# Patient Record
Sex: FEMALE | Race: OTHER | URBAN - METROPOLITAN AREA
[De-identification: names, ages, dates, MRNs, and addresses within clinical notes are randomized per-mention and may not be internally consistent; named-entity substitution may affect disease eponyms.]

---

## 2020-12-09 ENCOUNTER — NURSE TRIAGE (OUTPATIENT)
Dept: FAMILY MEDICINE CLINIC | Facility: CLINIC | Age: 30
End: 2020-12-09

## 2020-12-10 NOTE — TELEPHONE ENCOUNTER
Onset about a week, almost daily, R side lower abdominal pain, fatigue. LMP is not exact approximately 2 weeks ago, 'it was finished by thanksgiving' the patient is sexually active and not using birth control. She is not an established patient.  She was adv

## 2020-12-11 NOTE — TELEPHONE ENCOUNTER
Patient has new patient appointment scheduled with Dr. Noemy Ferro. Was advised ER yesterday, please advise on upcoming appointment.     Future Appointments   Date Time Provider Tami Rubin   12/15/2020  3:00 PM Christina Day MD [address] [place] [address]

## 2020-12-22 ENCOUNTER — TELEPHONE (OUTPATIENT)
Dept: FAMILY MEDICINE CLINIC | Facility: CLINIC | Age: 30
End: 2020-12-22

## 2020-12-22 ENCOUNTER — OFFICE VISIT (OUTPATIENT)
Dept: FAMILY MEDICINE CLINIC | Facility: CLINIC | Age: 30
End: 2020-12-22
Payer: COMMERCIAL

## 2020-12-22 VITALS
HEIGHT: 67 IN | WEIGHT: 141.19 LBS | DIASTOLIC BLOOD PRESSURE: 79 MMHG | HEART RATE: 65 BPM | SYSTOLIC BLOOD PRESSURE: 129 MMHG | TEMPERATURE: 98 F | BODY MASS INDEX: 22.16 KG/M2

## 2020-12-22 DIAGNOSIS — R10.2 PELVIC PAIN: Primary | ICD-10-CM

## 2020-12-22 DIAGNOSIS — Z11.3 SCREEN FOR STD (SEXUALLY TRANSMITTED DISEASE): ICD-10-CM

## 2020-12-22 DIAGNOSIS — Z12.4 PAP SMEAR FOR CERVICAL CANCER SCREENING: ICD-10-CM

## 2020-12-22 DIAGNOSIS — B37.3 VAGINAL CANDIDIASIS: ICD-10-CM

## 2020-12-22 PROCEDURE — 3074F SYST BP LT 130 MM HG: CPT | Performed by: FAMILY MEDICINE

## 2020-12-22 PROCEDURE — 81002 URINALYSIS NONAUTO W/O SCOPE: CPT | Performed by: FAMILY MEDICINE

## 2020-12-22 PROCEDURE — 3078F DIAST BP <80 MM HG: CPT | Performed by: FAMILY MEDICINE

## 2020-12-22 PROCEDURE — 3008F BODY MASS INDEX DOCD: CPT | Performed by: FAMILY MEDICINE

## 2020-12-22 PROCEDURE — 99203 OFFICE O/P NEW LOW 30 MIN: CPT | Performed by: FAMILY MEDICINE

## 2020-12-22 RX ORDER — FLUCONAZOLE 150 MG/1
150 TABLET ORAL DAILY
Qty: 2 TABLET | Refills: 2 | Status: SHIPPED | OUTPATIENT
Start: 2020-12-22

## 2020-12-22 RX ORDER — CLOTRIMAZOLE 1 %
1 CREAM WITH APPLICATOR VAGINAL 2 TIMES DAILY
Qty: 1 TUBE | Refills: 0 | Status: SHIPPED | OUTPATIENT
Start: 2020-12-22 | End: 2020-12-23

## 2020-12-22 NOTE — TELEPHONE ENCOUNTER
Tried calling [employer] but just keep ringing and went to busy signal.    Dr Lori Pérez below, not sure if the SIG or the quantity needs clarification. pended new script, do necessary corrections if needed before signing it.

## 2020-12-22 NOTE — TELEPHONE ENCOUNTER
[id number] calling to get clarification on the directions for the medication below. •  clotrimazole (GYNE-LOTRIMIN) 1 % Vaginal Cream, Place 1 Application vaginally 2 (two) times daily. , Disp: 1 Tube, Rfl: 0

## 2020-12-23 PROBLEM — R10.2 PELVIC PAIN: Status: ACTIVE | Noted: 2020-12-23

## 2020-12-23 RX ORDER — CLOTRIMAZOLE 1 %
1 CREAM WITH APPLICATOR VAGINAL 2 TIMES DAILY
Qty: 1 TUBE | Refills: 0 | Status: SHIPPED | OUTPATIENT
Start: 2020-12-23

## 2020-12-23 NOTE — PROGRESS NOTES
Timbo Evans is a 35year old female.   Patient presents with:  Abdominal Pain: Started about 3 weeks ago, pain initially was not consistent but is now, patient has some irritation around vaginal area  Routine Physical: Patient would like to st /79   Pulse 65   Temp 98.3 °F (36.8 °C) (Temporal)   Ht 5' 7\" (1.702 m)   Wt 141 lb 3.2 oz (64 kg)   LMP 11/22/2020 (Approximate)   BMI 22.12 kg/m²   GENERAL: well developed, well nourished,in no apparent distress  SKIN: no rashes,no suspicious le

## 2021-01-11 ENCOUNTER — OFFICE VISIT (OUTPATIENT)
Dept: OBGYN CLINIC | Facility: CLINIC | Age: 31
End: 2021-01-11
Payer: COMMERCIAL

## 2021-01-11 VITALS
BODY MASS INDEX: 21.56 KG/M2 | HEIGHT: 67 IN | DIASTOLIC BLOOD PRESSURE: 72 MMHG | SYSTOLIC BLOOD PRESSURE: 112 MMHG | WEIGHT: 137.38 LBS

## 2021-01-11 DIAGNOSIS — R10.2 PELVIC PAIN: Primary | ICD-10-CM

## 2021-01-11 PROCEDURE — 3078F DIAST BP <80 MM HG: CPT | Performed by: OBSTETRICS & GYNECOLOGY

## 2021-01-11 PROCEDURE — 3008F BODY MASS INDEX DOCD: CPT | Performed by: OBSTETRICS & GYNECOLOGY

## 2021-01-11 PROCEDURE — 99203 OFFICE O/P NEW LOW 30 MIN: CPT | Performed by: OBSTETRICS & GYNECOLOGY

## 2021-01-11 PROCEDURE — 3074F SYST BP LT 130 MM HG: CPT | Performed by: OBSTETRICS & GYNECOLOGY

## 2021-01-11 NOTE — PROGRESS NOTES
HPI:    Patient ID: Gabriela Lugo is a 35year old female. Patient reports pelvic pain since Thanksgiving that was constant but it is gone now. Patient is not on any contraception and would like to conceive in the near future.   Pain was loc Prescriptions      No prescriptions requested or ordered in this encounter       Imaging & Referrals:  US PELVIS (TRANSVAGINAL PELVIS) (CPT=76830)       #[phone]

## 2023-07-31 ENCOUNTER — OFFICE VISIT (OUTPATIENT)
Dept: FAMILY MEDICINE CLINIC | Facility: CLINIC | Age: 33
End: 2023-07-31
Payer: COMMERCIAL

## 2023-07-31 ENCOUNTER — TELEPHONE (OUTPATIENT)
Dept: OBGYN CLINIC | Facility: CLINIC | Age: 33
End: 2023-07-31

## 2023-07-31 VITALS
SYSTOLIC BLOOD PRESSURE: 114 MMHG | OXYGEN SATURATION: 98 % | HEIGHT: 67 IN | WEIGHT: 140 LBS | BODY MASS INDEX: 21.97 KG/M2 | DIASTOLIC BLOOD PRESSURE: 65 MMHG | RESPIRATION RATE: 18 BRPM | HEART RATE: 66 BPM | TEMPERATURE: 98 F

## 2023-07-31 DIAGNOSIS — R11.0 NAUSEA: ICD-10-CM

## 2023-07-31 DIAGNOSIS — Z32.01 ENCOUNTER FOR PREGNANCY TEST, RESULT POSITIVE: Primary | ICD-10-CM

## 2023-07-31 LAB
APPEARANCE: CLEAR
BILIRUBIN: NEGATIVE
CONTROL LINE PRESENT WITH A CLEAR BACKGROUND (YES/NO): YES YES/NO
GLUCOSE (URINE DIPSTICK): NEGATIVE MG/DL
KETONES (URINE DIPSTICK): NEGATIVE MG/DL
KIT LOT #: ABNORMAL NUMERIC
LEUKOCYTES: NEGATIVE
MULTISTIX LOT#: ABNORMAL NUMERIC
NITRITE, URINE: NEGATIVE
PH, URINE: 7 (ref 4.5–8)
PREGNANCY TEST, URINE: POSITIVE
PROTEIN (URINE DIPSTICK): NEGATIVE MG/DL
SPECIFIC GRAVITY: 1.02 (ref 1–1.03)
URINE-COLOR: YELLOW
UROBILINOGEN,SEMI-QN: 0.2 MG/DL (ref 0–1.9)

## 2023-07-31 PROCEDURE — 3078F DIAST BP <80 MM HG: CPT | Performed by: NURSE PRACTITIONER

## 2023-07-31 PROCEDURE — 81025 URINE PREGNANCY TEST: CPT | Performed by: NURSE PRACTITIONER

## 2023-07-31 PROCEDURE — 99203 OFFICE O/P NEW LOW 30 MIN: CPT | Performed by: NURSE PRACTITIONER

## 2023-07-31 PROCEDURE — 81003 URINALYSIS AUTO W/O SCOPE: CPT | Performed by: NURSE PRACTITIONER

## 2023-07-31 PROCEDURE — 3008F BODY MASS INDEX DOCD: CPT | Performed by: NURSE PRACTITIONER

## 2023-07-31 PROCEDURE — 3074F SYST BP LT 130 MM HG: CPT | Performed by: NURSE PRACTITIONER

## 2023-07-31 NOTE — TELEPHONE ENCOUNTER
Pt made appt on [employer], but asking for sooner availability. Last seen by Dr. Edwin Collins in 2021. Pls advise    8/23/2023    1:20 PM  20 mins. Jennifer Milligan MD   [facility]      Patient Comments:   A physical and pregnancy test. Have not been feeling well the past two weeks.

## 2023-08-08 ENCOUNTER — OFFICE VISIT (OUTPATIENT)
Dept: OBGYN CLINIC | Facility: CLINIC | Age: 33
End: 2023-08-08

## 2023-08-08 VITALS
HEIGHT: 67 IN | BODY MASS INDEX: 21.69 KG/M2 | SYSTOLIC BLOOD PRESSURE: 110 MMHG | DIASTOLIC BLOOD PRESSURE: 60 MMHG | WEIGHT: 138.19 LBS

## 2023-08-08 DIAGNOSIS — O21.9 VOMITING OR NAUSEA OF PREGNANCY: ICD-10-CM

## 2023-08-08 DIAGNOSIS — N91.2 AMENORRHEA: Primary | ICD-10-CM

## 2023-08-08 DIAGNOSIS — N92.6 MISSED MENSES: ICD-10-CM

## 2023-08-08 PROCEDURE — 3074F SYST BP LT 130 MM HG: CPT | Performed by: OBSTETRICS & GYNECOLOGY

## 2023-08-08 PROCEDURE — 3078F DIAST BP <80 MM HG: CPT | Performed by: OBSTETRICS & GYNECOLOGY

## 2023-08-08 PROCEDURE — 3008F BODY MASS INDEX DOCD: CPT | Performed by: OBSTETRICS & GYNECOLOGY

## 2023-08-08 RX ORDER — DOXYLAMINE SUCCINATE AND PYRIDOXINE HYDROCHLORIDE, DELAYED RELEASE TABLETS 10 MG/10 MG 10; 10 MG/1; MG/1
2 TABLET, DELAYED RELEASE ORAL NIGHTLY
Qty: 120 TABLET | Refills: 0 | Status: SHIPPED | OUTPATIENT
Start: 2023-08-08

## 2023-08-28 ENCOUNTER — ROUTINE PRENATAL (OUTPATIENT)
Dept: OBGYN CLINIC | Facility: CLINIC | Age: 33
End: 2023-08-28

## 2023-08-28 VITALS — DIASTOLIC BLOOD PRESSURE: 72 MMHG | WEIGHT: 140 LBS | SYSTOLIC BLOOD PRESSURE: 120 MMHG | BODY MASS INDEX: 22 KG/M2

## 2023-08-28 DIAGNOSIS — Z34.81 ENCOUNTER FOR SUPERVISION OF OTHER NORMAL PREGNANCY IN FIRST TRIMESTER: Primary | ICD-10-CM

## 2023-08-28 PROCEDURE — 3078F DIAST BP <80 MM HG: CPT | Performed by: OBSTETRICS & GYNECOLOGY

## 2023-08-28 PROCEDURE — 3074F SYST BP LT 130 MM HG: CPT | Performed by: OBSTETRICS & GYNECOLOGY

## 2023-08-28 PROCEDURE — 76817 TRANSVAGINAL US OBSTETRIC: CPT | Performed by: OBSTETRICS & GYNECOLOGY

## 2023-08-28 NOTE — PROGRESS NOTES
Tv utlrasound:   siup 13 2/7 weeks and edc 3/2/24 not c/w lmp  Fhts 162. Good fm. Noted on utlrasound. Normal uterus/cx/adnexae    Pt to schedule pap smear with next visit.

## 2023-09-15 ENCOUNTER — INITIAL PRENATAL (OUTPATIENT)
Dept: OBGYN CLINIC | Facility: CLINIC | Age: 33
End: 2023-09-15

## 2023-09-15 VITALS — SYSTOLIC BLOOD PRESSURE: 108 MMHG | WEIGHT: 142 LBS | BODY MASS INDEX: 22 KG/M2 | DIASTOLIC BLOOD PRESSURE: 70 MMHG

## 2023-09-15 DIAGNOSIS — Z11.3 SCREEN FOR STD (SEXUALLY TRANSMITTED DISEASE): ICD-10-CM

## 2023-09-15 DIAGNOSIS — Z34.82 ENCOUNTER FOR SUPERVISION OF OTHER NORMAL PREGNANCY IN SECOND TRIMESTER: Primary | ICD-10-CM

## 2023-09-15 PROCEDURE — 3074F SYST BP LT 130 MM HG: CPT | Performed by: OBSTETRICS & GYNECOLOGY

## 2023-09-15 PROCEDURE — 3078F DIAST BP <80 MM HG: CPT | Performed by: OBSTETRICS & GYNECOLOGY

## 2023-09-18 LAB
C TRACH DNA SPEC QL NAA+PROBE: NEGATIVE
HPV I/H RISK 1 DNA SPEC QL NAA+PROBE: NEGATIVE
N GONORRHOEA DNA SPEC QL NAA+PROBE: NEGATIVE

## 2023-09-22 ENCOUNTER — NURSE ONLY (OUTPATIENT)
Dept: OBGYN CLINIC | Facility: CLINIC | Age: 33
End: 2023-09-22

## 2023-09-22 DIAGNOSIS — Z34.02 ENCOUNTER FOR SUPERVISION OF NORMAL FIRST PREGNANCY IN SECOND TRIMESTER: Primary | ICD-10-CM

## 2023-09-22 RX ORDER — CHOLECALCIFEROL (VITAMIN D3) 25 MCG
1 TABLET,CHEWABLE ORAL DAILY
COMMUNITY

## 2023-09-22 NOTE — PROGRESS NOTES
Pt called today for RN [facility] Education. Missed menses apt with: VERONICA  LMP: 23    Pre  BMI: 21.92  EPDS score:   +UPT at home: 23  +UPT in office: 23    US:   Working S[place]NCHE[place]: 3/5/24  Hx of genetic abnormality in family: denies  Hx of varicella: had disease  Flu Vaccine: unsure  Covid Vaccine: no     Consent (if needed): n/a      Sterilization/Contraception: undecided    OUD Screening: Pt. Has answered NO 5P questions and has NO  risk factors. Pt. Given What pregnant women need to know handout. Educational material reviewed with patient: Prenatal care, nutrition, weight gain recommendations, travel, exercise, intercourse, pregnancy changes, safe medications, pregnancy and work, fetal movement, labor and  labor, warning signs, food safety, tdap, cord blood, breastfeeding, circumcision, and Group B strep. Plans to breastfeed. Undecided about circ if male. Blood transfusion if needed: consents  PN labs:     Optional genetic screening labs were reviewed: Fernando Wisdom, FTS with US, Quad screen MSAFP and CF screening. Pt opts for Perez Vann. Kit provided.     [facility] Media Policy: informed    NOB apt: had NOB 9/15 with VERONICA Scheduled 10/13 return OB with MISTI

## 2023-10-05 ENCOUNTER — TELEPHONE (OUTPATIENT)
Dept: OBGYN CLINIC | Facility: CLINIC | Age: 33
End: 2023-10-05

## 2023-10-05 NOTE — TELEPHONE ENCOUNTER
Incoming Fax received from [place] with patient's Panorama test results.     Sample collection date: 09/28/2023  Report date: 10/04/2023    Results: Low Risk  Low Risk for Aneuploidies and Microdeletions  Fetal Sex: Female  Fetal Fraction:  10.6

## 2023-10-13 ENCOUNTER — ROUTINE PRENATAL (OUTPATIENT)
Dept: OBGYN CLINIC | Facility: CLINIC | Age: 33
End: 2023-10-13
Payer: COMMERCIAL

## 2023-10-13 ENCOUNTER — HOSPITAL ENCOUNTER (OUTPATIENT)
Dept: ULTRASOUND IMAGING | Facility: HOSPITAL | Age: 33
Discharge: HOME | End: 2023-10-13
Attending: OBSTETRICS & GYNECOLOGY
Payer: COMMERCIAL

## 2023-10-13 VITALS — SYSTOLIC BLOOD PRESSURE: 114 MMHG | BODY MASS INDEX: 23 KG/M2 | WEIGHT: 147.81 LBS | DIASTOLIC BLOOD PRESSURE: 62 MMHG

## 2023-10-13 DIAGNOSIS — Z34.82 ENCOUNTER FOR SUPERVISION OF OTHER NORMAL PREGNANCY IN SECOND TRIMESTER: ICD-10-CM

## 2023-10-13 DIAGNOSIS — Z34.92 NORMAL PREGNANCY IN SECOND TRIMESTER: Primary | ICD-10-CM

## 2023-10-13 PROBLEM — R10.2 PELVIC PAIN: Status: RESOLVED | Noted: 2020-12-23 | Resolved: 2023-10-13

## 2023-10-13 PROCEDURE — 3074F SYST BP LT 130 MM HG: CPT | Performed by: OBSTETRICS & GYNECOLOGY

## 2023-10-13 PROCEDURE — 76805 OB US >/= 14 WKS SNGL FETUS: CPT | Performed by: OBSTETRICS & GYNECOLOGY

## 2023-10-13 PROCEDURE — 3078F DIAST BP <80 MM HG: CPT | Performed by: OBSTETRICS & GYNECOLOGY

## 2023-10-13 PROCEDURE — 90471 IMMUNIZATION ADMIN: CPT | Performed by: OBSTETRICS & GYNECOLOGY

## 2023-10-13 PROCEDURE — 76817 TRANSVAGINAL US OBSTETRIC: CPT | Performed by: OBSTETRICS & GYNECOLOGY

## 2023-10-13 PROCEDURE — 90686 IIV4 VACC NO PRSV 0.5 ML IM: CPT | Performed by: OBSTETRICS & GYNECOLOGY

## 2023-11-14 ENCOUNTER — ROUTINE PRENATAL (OUTPATIENT)
Dept: OBGYN CLINIC | Facility: CLINIC | Age: 33
End: 2023-11-14
Payer: COMMERCIAL

## 2023-11-14 VITALS
WEIGHT: 151.38 LBS | SYSTOLIC BLOOD PRESSURE: 118 MMHG | BODY MASS INDEX: 24 KG/M2 | DIASTOLIC BLOOD PRESSURE: 73 MMHG | HEART RATE: 70 BPM

## 2023-11-14 DIAGNOSIS — Z34.00 SUPERVISION OF NORMAL FIRST PREGNANCY, ANTEPARTUM: Primary | ICD-10-CM

## 2023-11-14 PROCEDURE — 3078F DIAST BP <80 MM HG: CPT | Performed by: OBSTETRICS & GYNECOLOGY

## 2023-11-14 PROCEDURE — 3074F SYST BP LT 130 MM HG: CPT | Performed by: OBSTETRICS & GYNECOLOGY

## 2023-11-15 ENCOUNTER — LAB ENCOUNTER (OUTPATIENT)
Dept: LAB | Age: 33
End: 2023-11-15
Attending: OBSTETRICS & GYNECOLOGY
Payer: COMMERCIAL

## 2023-11-15 DIAGNOSIS — Z34.92 NORMAL PREGNANCY IN SECOND TRIMESTER: ICD-10-CM

## 2023-11-15 DIAGNOSIS — Z34.02 ENCOUNTER FOR SUPERVISION OF NORMAL FIRST PREGNANCY IN SECOND TRIMESTER: ICD-10-CM

## 2023-11-15 LAB
ANTIBODY SCREEN: NEGATIVE
BASOPHILS # BLD AUTO: 0.05 X10(3) UL (ref 0–0.2)
BASOPHILS NFR BLD AUTO: 0.6 %
BILIRUB UR QL: NEGATIVE
CLARITY UR: CLEAR
DEPRECATED HBV CORE AB SER IA-ACNC: 16 NG/ML
DEPRECATED RDW RBC AUTO: 45.2 FL (ref 35.1–46.3)
EOSINOPHIL # BLD AUTO: 0.2 X10(3) UL (ref 0–0.7)
EOSINOPHIL NFR BLD AUTO: 2.6 %
ERYTHROCYTE [DISTWIDTH] IN BLOOD BY AUTOMATED COUNT: 14.5 % (ref 11–15)
GLUCOSE UR-MCNC: NORMAL MG/DL
HBV SURFACE AG SER-ACNC: <0.1 [IU]/L
HBV SURFACE AG SERPL QL IA: NONREACTIVE
HCT VFR BLD AUTO: 39.9 %
HCV AB SERPL QL IA: NONREACTIVE
HGB BLD-MCNC: 13.1 G/DL
HGB UR QL STRIP.AUTO: NEGATIVE
IMM GRANULOCYTES # BLD AUTO: 0.03 X10(3) UL (ref 0–1)
IMM GRANULOCYTES NFR BLD: 0.4 %
KETONES UR-MCNC: NEGATIVE MG/DL
LEUKOCYTE ESTERASE UR QL STRIP.AUTO: NEGATIVE
LYMPHOCYTES # BLD AUTO: 2.12 X10(3) UL (ref 1–4)
LYMPHOCYTES NFR BLD AUTO: 27.3 %
MCH RBC QN AUTO: 28.5 PG (ref 26–34)
MCHC RBC AUTO-ENTMCNC: 32.8 G/DL (ref 31–37)
MCV RBC AUTO: 86.7 FL
MONOCYTES # BLD AUTO: 0.58 X10(3) UL (ref 0.1–1)
MONOCYTES NFR BLD AUTO: 7.5 %
NEUTROPHILS # BLD AUTO: 4.78 X10 (3) UL (ref 1.5–7.7)
NEUTROPHILS # BLD AUTO: 4.78 X10(3) UL (ref 1.5–7.7)
NEUTROPHILS NFR BLD AUTO: 61.6 %
NITRITE UR QL STRIP.AUTO: NEGATIVE
PH UR: 6.5 [PH] (ref 5–8)
PLATELET # BLD AUTO: 187 10(3)UL (ref 150–450)
PROT UR-MCNC: NEGATIVE MG/DL
RBC # BLD AUTO: 4.6 X10(6)UL
RH BLOOD TYPE: NEGATIVE
RUBV IGG SER QL: POSITIVE
RUBV IGG SER-ACNC: 26.8 IU/ML (ref 10–?)
SP GR UR STRIP: 1.02 (ref 1–1.03)
UROBILINOGEN UR STRIP-ACNC: NORMAL
WBC # BLD AUTO: 7.8 X10(3) UL (ref 4–11)

## 2023-11-15 PROCEDURE — 87340 HEPATITIS B SURFACE AG IA: CPT

## 2023-11-15 PROCEDURE — 81003 URINALYSIS AUTO W/O SCOPE: CPT

## 2023-11-15 PROCEDURE — 36415 COLL VENOUS BLD VENIPUNCTURE: CPT

## 2023-11-15 PROCEDURE — 87389 HIV-1 AG W/HIV-1&-2 AB AG IA: CPT

## 2023-11-15 PROCEDURE — 86762 RUBELLA ANTIBODY: CPT

## 2023-11-15 PROCEDURE — 86850 RBC ANTIBODY SCREEN: CPT

## 2023-11-15 PROCEDURE — 82105 ALPHA-FETOPROTEIN SERUM: CPT

## 2023-11-15 PROCEDURE — 86803 HEPATITIS C AB TEST: CPT

## 2023-11-15 PROCEDURE — 86780 TREPONEMA PALLIDUM: CPT

## 2023-11-15 PROCEDURE — 82728 ASSAY OF FERRITIN: CPT

## 2023-11-15 PROCEDURE — 86901 BLOOD TYPING SEROLOGIC RH(D): CPT

## 2023-11-15 PROCEDURE — 87086 URINE CULTURE/COLONY COUNT: CPT

## 2023-11-15 PROCEDURE — 85025 COMPLETE CBC W/AUTO DIFF WBC: CPT

## 2023-11-15 PROCEDURE — 86900 BLOOD TYPING SEROLOGIC ABO: CPT

## 2023-11-16 LAB
AFP MOM: 5.97
AFP VALUE: 303.8 NG/ML
GA ON COLL DATE: 19 WEEKS
INSULIN DEP AFP: NO
MAT AGE AT EDD: 32.5 YR
MULTIPLE GEST AFP: NO
OSBR RISK 1 IN AFP: 10
WEIGHT AFP: 147 LBS

## 2023-11-17 ENCOUNTER — TELEPHONE (OUTPATIENT)
Dept: OBGYN CLINIC | Facility: CLINIC | Age: 33
End: 2023-11-17

## 2023-11-17 LAB — T PALLIDUM AB SER QL: NEGATIVE

## 2023-11-17 NOTE — TELEPHONE ENCOUNTER
Incoming call from lab in regards to AFP. Results came back positive. Patient completed on 11/15 at [facility] of 24w1d. Outside of time frame.  Sending to MISTI as [id number] Marlen Alvarez

## 2023-11-30 ENCOUNTER — LAB ENCOUNTER (OUTPATIENT)
Dept: LAB | Age: 33
End: 2023-11-30
Attending: OBSTETRICS & GYNECOLOGY
Payer: COMMERCIAL

## 2023-11-30 DIAGNOSIS — Z34.00 SUPERVISION OF NORMAL FIRST PREGNANCY, ANTEPARTUM: ICD-10-CM

## 2023-11-30 LAB — GLUCOSE 1H P GLC SERPL-MCNC: 174 MG/DL

## 2023-11-30 PROCEDURE — 82950 GLUCOSE TEST: CPT

## 2023-11-30 PROCEDURE — 36415 COLL VENOUS BLD VENIPUNCTURE: CPT

## 2023-12-01 DIAGNOSIS — R73.09 ELEVATED GLUCOSE TOLERANCE TEST: Primary | ICD-10-CM

## 2023-12-12 ENCOUNTER — ROUTINE PRENATAL (OUTPATIENT)
Dept: OBGYN CLINIC | Facility: CLINIC | Age: 33
End: 2023-12-12
Payer: COMMERCIAL

## 2023-12-12 DIAGNOSIS — O36.5931 SMALL FOR DATES AFFECTING MANAGEMENT OF MOTHER, THIRD TRIMESTER, FETUS 1: ICD-10-CM

## 2023-12-12 DIAGNOSIS — O26.899 RH NEGATIVE STATE IN ANTEPARTUM PERIOD: ICD-10-CM

## 2023-12-12 DIAGNOSIS — Z34.83 ENCOUNTER FOR SUPERVISION OF OTHER NORMAL PREGNANCY IN THIRD TRIMESTER: Primary | ICD-10-CM

## 2023-12-12 DIAGNOSIS — Z67.91 RH NEGATIVE STATE IN ANTEPARTUM PERIOD: ICD-10-CM

## 2023-12-12 LAB
APPEARANCE: CLEAR
BILIRUBIN: NEGATIVE
GLUCOSE (URINE DIPSTICK): NEGATIVE MG/DL
KETONES (URINE DIPSTICK): NEGATIVE MG/DL
LEUKOCYTES: NEGATIVE
MULTISTIX LOT#: NORMAL NUMERIC
NITRITE, URINE: NEGATIVE
OCCULT BLOOD: NEGATIVE
PH, URINE: 6 (ref 4.5–8)
SPECIFIC GRAVITY: 1.01 (ref 1–1.03)
URINE-COLOR: YELLOW
UROBILINOGEN,SEMI-QN: 0.2 MG/DL (ref 0–1.9)

## 2023-12-12 PROCEDURE — 90715 TDAP VACCINE 7 YRS/> IM: CPT | Performed by: OBSTETRICS & GYNECOLOGY

## 2023-12-12 PROCEDURE — 90471 IMMUNIZATION ADMIN: CPT | Performed by: OBSTETRICS & GYNECOLOGY

## 2023-12-12 PROCEDURE — 81002 URINALYSIS NONAUTO W/O SCOPE: CPT | Performed by: OBSTETRICS & GYNECOLOGY

## 2023-12-12 RX ORDER — FERROUS SULFATE 325(65) MG
325 TABLET ORAL EVERY OTHER DAY
Qty: 45 TABLET | Refills: 1 | Status: ON HOLD | OUTPATIENT
Start: 2023-12-12

## 2023-12-12 NOTE — PROGRESS NOTES
[facility]  Obstetrics and Gynecology  Prenatal Visit  Denny Preston MD    GREGORIA Hamlin is a 29year old. o. Edis Wolffn 28w0d weeks. Here for routine prenatal visit and is without complaints. Patient denies any regular uterine contractions, spontaneous rupture membranes or vaginal bleeding. Patient feeling normal fetal movement. OB History     OB History    Para Term  AB Living   1 0 0 0 0 0   SAB IAB Ectopic Multiple Live Births   0 0 0 0 0      # Outcome Date GA Lbr Robson/2nd Weight Sex Delivery Anes PTL Lv   1 Current              Medications     Current Outpatient Medications   Medication Sig Dispense Refill    Ferrous Sulfate 325 (65 Fe) MG Oral Tab Take 1 tablet (325 mg total) by mouth every other day. 45 tablet 1    prenatal vitamin with DHA 27-0.8-228 MG Oral Cap Take 1 capsule by mouth daily. Exam   LMP 2023 (Exact Date)   FH=24 cm  Assessment   Darío Carroll is a 29year old female  with viable IUP at 28w0d weeks. Primigravid pregnancy with size<dates  Rh negative status    ICD-10-CM    1. Encounter for supervision of other normal pregnancy in third trimester  Z34.83 POC Urinalysis, Manual Dip without microscopy [[id number]]     Antibody Screen     Patient Blood Type (ABORh)      2. Small for dates affecting management of mother, third trimester, fetus 1  [facility] - INTERNAL      3. Rh negative state in antepartum period  O26.899     Z67.91         Plan   Pt to go for Rhogam/Type and screen prior ASAP. Pt to have MAXIMILIAN/ Yakov Duran  for growth. Pt counseled on recommendations of tdap in pregnancy. Discussed risks of pertussis/\"whooping cough\", in newborns. Discussed benefits of preventing pertussis in those around the  such as parents, grandparents, caregivers, household contacts, other children etc.  Discussed side effects and rare risks of tdap vaccine including redness, pain, fatigue, body aches and fever.   Pt understands and tdap was offered/given.     Iris Browne MD, MD  4:31 PM  12/12/2023

## 2023-12-18 ENCOUNTER — ULTRASOUND ENCOUNTER (OUTPATIENT)
Dept: PERINATAL CARE | Facility: HOSPITAL | Age: 33
End: 2023-12-18
Attending: OBSTETRICS & GYNECOLOGY
Payer: COMMERCIAL

## 2023-12-18 ENCOUNTER — HOSPITAL ENCOUNTER (INPATIENT)
Facility: HOSPITAL | Age: 33
LOS: 6 days | Discharge: HOME | End: 2023-12-24
Attending: OBSTETRICS & GYNECOLOGY | Admitting: OBSTETRICS & GYNECOLOGY
Payer: COMMERCIAL

## 2023-12-18 ENCOUNTER — LAB ENCOUNTER (OUTPATIENT)
Dept: LAB | Facility: REFERENCE LAB | Age: 33
End: 2023-12-18
Attending: OBSTETRICS & GYNECOLOGY
Payer: COMMERCIAL

## 2023-12-18 ENCOUNTER — TELEPHONE (OUTPATIENT)
Dept: OBGYN CLINIC | Facility: CLINIC | Age: 33
End: 2023-12-18

## 2023-12-18 VITALS
DIASTOLIC BLOOD PRESSURE: 87 MMHG | BODY MASS INDEX: 24.64 KG/M2 | HEART RATE: 64 BPM | WEIGHT: 157 LBS | HEIGHT: 67 IN | SYSTOLIC BLOOD PRESSURE: 135 MMHG

## 2023-12-18 DIAGNOSIS — Z67.91 RH NEGATIVE STATE IN ANTEPARTUM PERIOD: ICD-10-CM

## 2023-12-18 DIAGNOSIS — O36.5990 POOR FETAL GROWTH AFFECTING MANAGEMENT OF MOTHER: ICD-10-CM

## 2023-12-18 DIAGNOSIS — O13.3 GESTATIONAL HYPERTENSION, THIRD TRIMESTER: ICD-10-CM

## 2023-12-18 DIAGNOSIS — O36.5930 POOR FETAL GROWTH AFFECTING MANAGEMENT OF MOTHER IN THIRD TRIMESTER, SINGLE OR UNSPECIFIED FETUS: ICD-10-CM

## 2023-12-18 DIAGNOSIS — O36.5931 IUGR (INTRAUTERINE GROWTH RESTRICTION) AFFECTING CARE OF MOTHER, THIRD TRIMESTER, FETUS 1: ICD-10-CM

## 2023-12-18 DIAGNOSIS — O36.5990 POOR FETAL GROWTH AFFECTING MANAGEMENT OF MOTHER: Primary | ICD-10-CM

## 2023-12-18 DIAGNOSIS — Z34.83 ENCOUNTER FOR SUPERVISION OF OTHER NORMAL PREGNANCY IN THIRD TRIMESTER: ICD-10-CM

## 2023-12-18 DIAGNOSIS — R73.09 ELEVATED GLUCOSE TOLERANCE TEST: ICD-10-CM

## 2023-12-18 DIAGNOSIS — O26.899 RH NEGATIVE STATE IN ANTEPARTUM PERIOD: ICD-10-CM

## 2023-12-18 LAB
ALBUMIN SERPL-MCNC: 2.8 G/DL (ref 3.4–5)
ALBUMIN/GLOB SERPL: 0.7 {RATIO} (ref 1–2)
ALP LIVER SERPL-CCNC: 118 U/L
ALT SERPL-CCNC: 17 U/L
ANION GAP SERPL CALC-SCNC: 6 MMOL/L (ref 0–18)
ANTIBODY SCREEN: POSITIVE
ANTIBODY SCREEN: POSITIVE
AST SERPL-CCNC: 16 U/L (ref 15–37)
BASOPHILS # BLD AUTO: 0.04 X10(3) UL (ref 0–0.2)
BASOPHILS NFR BLD AUTO: 0.5 %
BILIRUB SERPL-MCNC: 0.4 MG/DL (ref 0.1–2)
BILIRUB UR QL STRIP.AUTO: NEGATIVE
BUN BLD-MCNC: 12 MG/DL (ref 9–23)
CALCIUM BLD-MCNC: 8.7 MG/DL (ref 8.5–10.1)
CHLORIDE SERPL-SCNC: 107 MMOL/L (ref 98–112)
CLARITY UR REFRACT.AUTO: CLEAR
CO2 SERPL-SCNC: 24 MMOL/L (ref 21–32)
COLOR UR AUTO: COLORLESS
CREAT BLD-MCNC: 0.75 MG/DL
DIRECT COOMBS POLY: NEGATIVE
EGFRCR SERPLBLD CKD-EPI 2021: 108 ML/MIN/1.73M2 (ref 60–?)
EOSINOPHIL # BLD AUTO: 0.09 X10(3) UL (ref 0–0.7)
EOSINOPHIL NFR BLD AUTO: 1.1 %
ERYTHROCYTE [DISTWIDTH] IN BLOOD BY AUTOMATED COUNT: 14.4 %
GLOBULIN PLAS-MCNC: 3.8 G/DL (ref 2.8–4.4)
GLUCOSE 1H P GLC SERPL-MCNC: 173 MG/DL
GLUCOSE 2H P GLC SERPL-MCNC: 154 MG/DL
GLUCOSE 3H P GLC SERPL-MCNC: 86 MG/DL (ref 70–140)
GLUCOSE BLD-MCNC: 105 MG/DL (ref 70–99)
GLUCOSE P FAST SERPL-MCNC: 79 MG/DL
GLUCOSE UR STRIP.AUTO-MCNC: NORMAL MG/DL
HCT VFR BLD AUTO: 39.5 %
HGB BLD-MCNC: 13.5 G/DL
HIV 1+2 AB+HIV1 P24 AG SERPL QL IA: NONREACTIVE
IMM GRANULOCYTES # BLD AUTO: 0.02 X10(3) UL (ref 0–1)
IMM GRANULOCYTES NFR BLD: 0.2 %
KETONES UR STRIP.AUTO-MCNC: NEGATIVE MG/DL
LEUKOCYTE ESTERASE UR QL STRIP.AUTO: 25
LYMPHOCYTES # BLD AUTO: 2.17 X10(3) UL (ref 1–4)
LYMPHOCYTES NFR BLD AUTO: 25.7 %
MCH RBC QN AUTO: 29.1 PG (ref 26–34)
MCHC RBC AUTO-ENTMCNC: 34.2 G/DL (ref 31–37)
MCV RBC AUTO: 85.1 FL
MONOCYTES # BLD AUTO: 0.56 X10(3) UL (ref 0.1–1)
MONOCYTES NFR BLD AUTO: 6.6 %
NEUTROPHILS # BLD AUTO: 5.55 X10 (3) UL (ref 1.5–7.7)
NEUTROPHILS # BLD AUTO: 5.55 X10(3) UL (ref 1.5–7.7)
NEUTROPHILS NFR BLD AUTO: 65.9 %
NITRITE UR QL STRIP.AUTO: NEGATIVE
OSMOLALITY SERPL CALC.SUM OF ELEC: 284 MOSM/KG (ref 275–295)
PH UR STRIP.AUTO: 6.5 [PH] (ref 5–8)
PLATELET # BLD AUTO: 177 10(3)UL (ref 150–450)
POTASSIUM SERPL-SCNC: 4.1 MMOL/L (ref 3.5–5.1)
PROT SERPL-MCNC: 6.6 G/DL (ref 6.4–8.2)
PROT UR STRIP.AUTO-MCNC: NEGATIVE MG/DL
RBC # BLD AUTO: 4.64 X10(6)UL
RBC UR QL AUTO: NEGATIVE
RH BLOOD TYPE: NEGATIVE
RH BLOOD TYPE: NEGATIVE
SODIUM SERPL-SCNC: 137 MMOL/L (ref 136–145)
SP GR UR STRIP.AUTO: 1.01 (ref 1–1.03)
T PALLIDUM AB SER QL IA: NONREACTIVE
UROBILINOGEN UR STRIP.AUTO-MCNC: NORMAL MG/DL
WBC # BLD AUTO: 8.4 X10(3) UL (ref 4–11)

## 2023-12-18 PROCEDURE — 82952 GTT-ADDED SAMPLES: CPT

## 2023-12-18 PROCEDURE — 76820 UMBILICAL ARTERY ECHO: CPT

## 2023-12-18 PROCEDURE — 36415 COLL VENOUS BLD VENIPUNCTURE: CPT

## 2023-12-18 PROCEDURE — 86880 COOMBS TEST DIRECT: CPT

## 2023-12-18 PROCEDURE — 86077 PHYS BLOOD BANK SERV XMATCH: CPT

## 2023-12-18 PROCEDURE — 86901 BLOOD TYPING SEROLOGIC RH(D): CPT

## 2023-12-18 PROCEDURE — 86900 BLOOD TYPING SEROLOGIC ABO: CPT

## 2023-12-18 PROCEDURE — 86850 RBC ANTIBODY SCREEN: CPT

## 2023-12-18 PROCEDURE — 86886 COOMBS TEST INDIRECT TITER: CPT

## 2023-12-18 PROCEDURE — 82951 GLUCOSE TOLERANCE TEST (GTT): CPT

## 2023-12-18 PROCEDURE — 86870 RBC ANTIBODY IDENTIFICATION: CPT

## 2023-12-18 PROCEDURE — 76811 OB US DETAILED SNGL FETUS: CPT | Performed by: OBSTETRICS & GYNECOLOGY

## 2023-12-18 RX ORDER — BETAMETHASONE SODIUM PHOSPHATE AND BETAMETHASONE ACETATE 3; 3 MG/ML; MG/ML
INJECTION, SUSPENSION INTRA-ARTICULAR; INTRALESIONAL; INTRAMUSCULAR; SOFT TISSUE
Status: DISCONTINUED
Start: 2023-12-18 | End: 2023-12-18 | Stop reason: WASHOUT

## 2023-12-18 RX ORDER — ACETAMINOPHEN 500 MG
500 TABLET ORAL EVERY 6 HOURS PRN
Status: DISCONTINUED | OUTPATIENT
Start: 2023-12-18 | End: 2023-12-20

## 2023-12-18 RX ORDER — DOCUSATE SODIUM 100 MG/1
100 CAPSULE, LIQUID FILLED ORAL 2 TIMES DAILY
Status: DISCONTINUED | OUTPATIENT
Start: 2023-12-18 | End: 2023-12-20

## 2023-12-18 RX ORDER — ZOLPIDEM TARTRATE 5 MG/1
5 TABLET ORAL NIGHTLY PRN
Status: DISCONTINUED | OUTPATIENT
Start: 2023-12-18 | End: 2023-12-20

## 2023-12-18 RX ORDER — MELATONIN
325
Status: DISCONTINUED | OUTPATIENT
Start: 2023-12-19 | End: 2023-12-20

## 2023-12-18 RX ORDER — CALCIUM CARBONATE 500 MG/1
1000 TABLET, CHEWABLE ORAL
Status: DISCONTINUED | OUTPATIENT
Start: 2023-12-18 | End: 2023-12-20

## 2023-12-18 RX ORDER — CHOLECALCIFEROL (VITAMIN D3) 25 MCG
1 TABLET,CHEWABLE ORAL DAILY
Status: DISCONTINUED | OUTPATIENT
Start: 2023-12-18 | End: 2023-12-20

## 2023-12-18 RX ORDER — ACETAMINOPHEN 500 MG
1000 TABLET ORAL EVERY 6 HOURS PRN
Status: DISCONTINUED | OUTPATIENT
Start: 2023-12-18 | End: 2023-12-20

## 2023-12-18 RX ORDER — DOXYLAMINE SUCCINATE AND PYRIDOXINE HYDROCHLORIDE, DELAYED RELEASE TABLETS 10 MG/10 MG 10; 10 MG/1; MG/1
2 TABLET, DELAYED RELEASE ORAL NIGHTLY
Status: ON HOLD | COMMUNITY

## 2023-12-18 RX ORDER — BETAMETHASONE SODIUM PHOSPHATE AND BETAMETHASONE ACETATE 3; 3 MG/ML; MG/ML
12 INJECTION, SUSPENSION INTRA-ARTICULAR; INTRALESIONAL; INTRAMUSCULAR; SOFT TISSUE EVERY 24 HOURS
Status: COMPLETED | OUTPATIENT
Start: 2023-12-18 | End: 2023-12-19

## 2023-12-18 NOTE — TELEPHONE ENCOUNTER
Blood bank calling to ask if patient has received any previous transfusions or rhogam. Please advise.

## 2023-12-18 NOTE — TELEPHONE ENCOUNTER
Spoke with [employer]. Per chart does not seem like she received rhogam. Verbalized understanding and agreed.

## 2023-12-18 NOTE — PROGRESS NOTES
Pt is a 29year old female admitted to 1107/1107-A. Chief Complaint   Patient presents with     Complication     Sent from [facility]- IUGR and Abnormal dopplers      Pt is  28w6d intra-uterine pregnancy. History obtained, consents signed. Oriented to room, staff, and plan of care.

## 2023-12-19 ENCOUNTER — ULTRASOUND ENCOUNTER (OUTPATIENT)
Dept: PERINATAL CARE | Facility: HOSPITAL | Age: 33
End: 2023-12-19
Attending: OBSTETRICS & GYNECOLOGY
Payer: COMMERCIAL

## 2023-12-19 DIAGNOSIS — O36.5931 IUGR (INTRAUTERINE GROWTH RESTRICTION) AFFECTING CARE OF MOTHER, THIRD TRIMESTER, FETUS 1: Primary | ICD-10-CM

## 2023-12-19 PROBLEM — Z3A.28 28 WEEKS GESTATION OF PREGNANCY (HCC): Status: ACTIVE | Noted: 2023-12-19

## 2023-12-19 PROBLEM — Z3A.28 28 WEEKS GESTATION OF PREGNANCY: Status: ACTIVE | Noted: 2023-12-19

## 2023-12-19 PROCEDURE — 76820 UMBILICAL ARTERY ECHO: CPT

## 2023-12-19 PROCEDURE — 99231 SBSQ HOSP IP/OBS SF/LOW 25: CPT | Performed by: STUDENT IN AN ORGANIZED HEALTH CARE EDUCATION/TRAINING PROGRAM

## 2023-12-19 PROCEDURE — 76819 FETAL BIOPHYS PROFIL W/O NST: CPT

## 2023-12-19 PROCEDURE — 76821 MIDDLE CEREBRAL ARTERY ECHO: CPT

## 2023-12-19 PROCEDURE — 59025 FETAL NON-STRESS TEST: CPT | Performed by: STUDENT IN AN ORGANIZED HEALTH CARE EDUCATION/TRAINING PROGRAM

## 2023-12-19 PROCEDURE — 99222 1ST HOSP IP/OBS MODERATE 55: CPT | Performed by: OBSTETRICS & GYNECOLOGY

## 2023-12-19 PROCEDURE — 76815 OB US LIMITED FETUS(S): CPT | Performed by: OBSTETRICS & GYNECOLOGY

## 2023-12-19 RX ORDER — SODIUM CHLORIDE, SODIUM LACTATE, POTASSIUM CHLORIDE, CALCIUM CHLORIDE 600; 310; 30; 20 MG/100ML; MG/100ML; MG/100ML; MG/100ML
INJECTION, SOLUTION INTRAVENOUS CONTINUOUS
Status: DISCONTINUED | OUTPATIENT
Start: 2023-12-19 | End: 2023-12-20

## 2023-12-19 RX ORDER — BETAMETHASONE SODIUM PHOSPHATE AND BETAMETHASONE ACETATE 3; 3 MG/ML; MG/ML
INJECTION, SUSPENSION INTRA-ARTICULAR; INTRALESIONAL; INTRAMUSCULAR; SOFT TISSUE
Status: DISPENSED
Start: 2023-12-19 | End: 2023-12-20

## 2023-12-19 NOTE — PLAN OF CARE
Problem: ANTEPARTUM/LABOR and DELIVERY  Goal: Maintain pregnancy as long as maternal and/or fetal condition is stable  Description: INTERVENTIONS:  - Maternal surveillance  - Fetal surveillance  - Monitor uterine activity  - Medications as ordered  - Bedrest  Outcome: Progressing  Goal: Anxiety is at manageable level  Description: INTERVENTIONS:  - [place] patient to unit/surroundings  - Establish a trusting relationship with patient  - Discuss possible complications or alterations in birth plan  - Explain treatment plan  - Explain testing/procedures prior to initiation  - Encourage participation in care  - Encourage verbalization of concerns/fears  - Identify coping mechanisms  - Administer/offer alternative therapies (Aroma therapy, distraction, guided imagery, massage, music therapy, therapeutic touch)  - Manage patient's environment (Avoid overstimulation of patient)  Outcome: Progressing  Goal: Demonstrates ability to cope with hospitalization/illness  Description: INTERVENTIONS:  - Encourage verbalization of feelings/concerns/expectations  - Provide quiet environment  - Assist patient to identify own strengths and abilities  - Encourage patient to set small goals for self  - Encourage participation in diversional activity  - Reinforce positive adaptation of new coping behaviors  - Include patient/family/caregiver in decisions  Outcome: Progressing     Problem: Patient/Family Goals  Goal: Patient/Family Long Term Goal  Description: Patient's Long Term Goal:    Interventions:  - See additional Care Plan goals for specific interventions  Outcome: Progressing  Goal: Patient/Family Short Term Goal  Description: Patient's Short Term Goal:   Interventions:   - See additional Care Plan goals for specific interventions  Outcome: Progressing

## 2023-12-19 NOTE — CM/SW NOTE
12/19/23 1100   Financial Resource Strain   How hard is it for you to pay for things like household items or child/elder care? Not hard   Do you have trouble affording medicines, medical supplies, or paying for your care? N   Utilities   In the past 12 months has the electric, gas, oil, or water company threatened to shut off services in your home? No   Food Insecurity   Recently, have there been times that your food ran out and you didn't have money to get more? Never true   Did patient receive WIC with this pregnancy? No   Transportation Needs   Currently, has lack of transportation kept you from getting where you want or need to go? (For ex: to medical appointments, picking up medications, groceries, or work)? no   Housing Stability   In the past 12 months, was there a time when you did not have a steady place to sleep or slept in a shelter? N   Domestic safety   At any time do you feel concerned for the safety/well-being of yourself and/or your children, in your home or elsewhere? N   Stress   Would you like help finding professional services to help with stress, depression, anxiety, or other mental health concerns? N     Pt discussed with RN. SW met with pt to complete assessment, and offer support as she is new antepartum admitted for IUGR. Pt presented with a cheerful affect. Pt sps at bedside also with a cheerful affect. Pt reports she lives in [place] with her sps, and this is their first baby. Pt reports she has a 15 y/o stepdaughter, from sps previous relationship. Pt reports strong support from her family, friends, and sps. Pt reports she stopped working 2 weeks ago, and sps is still working but his employer is understanding of situation. Pt reports hx of anxiety, and depression. Pt reports she has a therapist whom she has been seeing 1x/wk for approximately 5 weeks now. Pt reports her therapist is helpful, and they have discussed coping skills which she is utilizing now.  Pt denies any hx of medication use. Pt reports she is doing okay, and is doing her best to stay calm. Pt denies any immediate PPA/PPD concerns. SW reviewed support services for the NICU including Kristin Grimaldo family room and sleep room areas, NICU Facebook page, NICU support group and role of NICU  with contact information. SW reviewed Postpartum Depression warning signs and support services. SW to remain available for any dc planning, or additional need for support.     French Masters [facility]  Discharge Planner  [id number]

## 2023-12-19 NOTE — CM/SW NOTE
Team rounds done on patient. Team reviewed patient plan of care and possible discharge needs. Team members present: Selin Garcia, Luz Elena1 Dianelys Gillis; Hillary Menard. Case Manger; and RN caring for patient.

## 2023-12-19 NOTE — PROGRESS NOTES
12/19/23 1130   OB Provider Notification   Provider Name/Title Dr. Erika Conn   Notification Time 1130   Method of Notification Phone   Request   (consult)     Notified of Dante consult

## 2023-12-19 NOTE — PLAN OF CARE
Problem: ANTEPARTUM/LABOR and DELIVERY  Goal: Maintain pregnancy as long as maternal and/or fetal condition is stable  Description: INTERVENTIONS:  - Maternal surveillance  - Fetal surveillance  - Monitor uterine activity  - Medications as ordered  - Bedrest  Outcome: Progressing  Goal: Anxiety is at manageable level  Description: INTERVENTIONS:  - [place] patient to unit/surroundings  - Establish a trusting relationship with patient  - Discuss possible complications or alterations in birth plan  - Explain treatment plan  - Explain testing/procedures prior to initiation  - Encourage participation in care  - Encourage verbalization of concerns/fears  - Identify coping mechanisms  - Administer/offer alternative therapies (Aroma therapy, distraction, guided imagery, massage, music therapy, therapeutic touch)  - Manage patient's environment (Avoid overstimulation of patient)  Outcome: Progressing  Goal: Demonstrates ability to cope with hospitalization/illness  Description: INTERVENTIONS:  - Encourage verbalization of feelings/concerns/expectations  - Provide quiet environment  - Assist patient to identify own strengths and abilities  - Encourage patient to set small goals for self  - Encourage participation in diversional activity  - Reinforce positive adaptation of new coping behaviors  - Include patient/family/caregiver in decisions  Outcome: Progressing

## 2023-12-20 ENCOUNTER — ANESTHESIA EVENT (OUTPATIENT)
Dept: OBGYN UNIT | Facility: HOSPITAL | Age: 33
End: 2023-12-20
Payer: COMMERCIAL

## 2023-12-20 ENCOUNTER — ANESTHESIA (OUTPATIENT)
Dept: OBGYN UNIT | Facility: HOSPITAL | Age: 33
End: 2023-12-20
Payer: COMMERCIAL

## 2023-12-20 ENCOUNTER — ULTRASOUND ENCOUNTER (OUTPATIENT)
Dept: PERINATAL CARE | Facility: HOSPITAL | Age: 33
End: 2023-12-20
Attending: OBSTETRICS & GYNECOLOGY
Payer: COMMERCIAL

## 2023-12-20 DIAGNOSIS — O36.5931 IUGR (INTRAUTERINE GROWTH RESTRICTION) AFFECTING CARE OF MOTHER, THIRD TRIMESTER, FETUS 1: Primary | ICD-10-CM

## 2023-12-20 PROBLEM — O16.3 HYPERTENSION AFFECTING PREGNANCY IN THIRD TRIMESTER (HCC): Status: ACTIVE | Noted: 2023-12-20

## 2023-12-20 PROBLEM — O13.3 GESTATIONAL HYPERTENSION, THIRD TRIMESTER: Status: ACTIVE | Noted: 2023-12-20

## 2023-12-20 PROBLEM — O13.3 GESTATIONAL HYPERTENSION, THIRD TRIMESTER (HCC): Status: ACTIVE | Noted: 2023-12-20

## 2023-12-20 PROBLEM — Z3A.28 28 WEEKS GESTATION OF PREGNANCY (HCC): Status: RESOLVED | Noted: 2023-12-19 | Resolved: 2023-12-20

## 2023-12-20 PROBLEM — O36.0931: Status: ACTIVE | Noted: 2023-12-20

## 2023-12-20 PROBLEM — Z3A.29 29 WEEKS GESTATION OF PREGNANCY (HCC): Status: ACTIVE | Noted: 2023-12-20

## 2023-12-20 PROBLEM — R00.1 BRADYCARDIA: Status: ACTIVE | Noted: 2023-12-20

## 2023-12-20 PROBLEM — Z3A.28 28 WEEKS GESTATION OF PREGNANCY: Status: RESOLVED | Noted: 2023-12-19 | Resolved: 2023-12-20

## 2023-12-20 PROBLEM — Z34.93 PREGNANCY WITH PRENATAL CARE ELSEWHERE IN THIRD TRIMESTER (HCC): Status: ACTIVE | Noted: 2023-12-20

## 2023-12-20 PROBLEM — Z34.93 PREGNANCY WITH PRENATAL CARE ELSEWHERE IN THIRD TRIMESTER: Status: ACTIVE | Noted: 2023-12-20

## 2023-12-20 PROBLEM — Z3A.29 29 WEEKS GESTATION OF PREGNANCY: Status: ACTIVE | Noted: 2023-12-20

## 2023-12-20 PROBLEM — O16.3 HYPERTENSION AFFECTING PREGNANCY IN THIRD TRIMESTER: Status: ACTIVE | Noted: 2023-12-20

## 2023-12-20 LAB
ALBUMIN SERPL-MCNC: 2.6 G/DL (ref 3.4–5)
ALBUMIN/GLOB SERPL: 0.8 {RATIO} (ref 1–2)
ALP LIVER SERPL-CCNC: 96 U/L
ALT SERPL-CCNC: 15 U/L
ANION GAP SERPL CALC-SCNC: 2 MMOL/L (ref 0–18)
AST SERPL-CCNC: 13 U/L (ref 15–37)
BASOPHILS # BLD AUTO: 0.02 X10(3) UL (ref 0–0.2)
BASOPHILS NFR BLD AUTO: 0.1 %
BILIRUB SERPL-MCNC: 0.3 MG/DL (ref 0.1–2)
BUN BLD-MCNC: 10 MG/DL (ref 9–23)
CALCIUM BLD-MCNC: 8.1 MG/DL (ref 8.5–10.1)
CHLORIDE SERPL-SCNC: 112 MMOL/L (ref 98–112)
CO2 SERPL-SCNC: 25 MMOL/L (ref 21–32)
CREAT BLD-MCNC: 0.65 MG/DL
CREAT UR-SCNC: 25.9 MG/DL
EGFRCR SERPLBLD CKD-EPI 2021: 120 ML/MIN/1.73M2 (ref 60–?)
EOSINOPHIL # BLD AUTO: 0 X10(3) UL (ref 0–0.7)
EOSINOPHIL NFR BLD AUTO: 0 %
ERYTHROCYTE [DISTWIDTH] IN BLOOD BY AUTOMATED COUNT: 14.6 %
FETAL SCREEN RESULT: NEGATIVE
GLOBULIN PLAS-MCNC: 3.1 G/DL (ref 2.8–4.4)
GLUCOSE BLD-MCNC: 129 MG/DL (ref 70–99)
HCT VFR BLD AUTO: 37.6 %
HGB BLD-MCNC: 12.4 G/DL
IMM GRANULOCYTES # BLD AUTO: 0.06 X10(3) UL (ref 0–1)
IMM GRANULOCYTES NFR BLD: 0.4 %
LYMPHOCYTES # BLD AUTO: 2.38 X10(3) UL (ref 1–4)
LYMPHOCYTES NFR BLD AUTO: 16.3 %
MCH RBC QN AUTO: 29.2 PG (ref 26–34)
MCHC RBC AUTO-ENTMCNC: 33 G/DL (ref 31–37)
MCV RBC AUTO: 88.7 FL
MONOCYTES # BLD AUTO: 0.66 X10(3) UL (ref 0.1–1)
MONOCYTES NFR BLD AUTO: 4.5 %
NEUTROPHILS # BLD AUTO: 11.47 X10 (3) UL (ref 1.5–7.7)
NEUTROPHILS # BLD AUTO: 11.47 X10(3) UL (ref 1.5–7.7)
NEUTROPHILS NFR BLD AUTO: 78.7 %
OSMOLALITY SERPL CALC.SUM OF ELEC: 289 MOSM/KG (ref 275–295)
PLATELET # BLD AUTO: 174 10(3)UL (ref 150–450)
POTASSIUM SERPL-SCNC: 4.1 MMOL/L (ref 3.5–5.1)
PROT SERPL-MCNC: 5.7 G/DL (ref 6.4–8.2)
PROT UR-MCNC: 5.8 MG/DL
PROT/CREAT UR-RTO: 0.22
RBC # BLD AUTO: 4.24 X10(6)UL
SODIUM SERPL-SCNC: 139 MMOL/L (ref 136–145)
URATE SERPL-MCNC: 4 MG/DL
WBC # BLD AUTO: 14.6 X10(3) UL (ref 4–11)

## 2023-12-20 PROCEDURE — 76818 FETAL BIOPHYS PROFILE W/NST: CPT | Performed by: OBSTETRICS & GYNECOLOGY

## 2023-12-20 PROCEDURE — 3E0334Z INTRODUCTION OF SERUM, TOXOID AND VACCINE INTO PERIPHERAL VEIN, PERCUTANEOUS APPROACH: ICD-10-PCS | Performed by: OBSTETRICS & GYNECOLOGY

## 2023-12-20 PROCEDURE — 59514 CESAREAN DELIVERY ONLY: CPT | Performed by: OBSTETRICS & GYNECOLOGY

## 2023-12-20 PROCEDURE — 76820 UMBILICAL ARTERY ECHO: CPT

## 2023-12-20 PROCEDURE — 59510 CESAREAN DELIVERY: CPT | Performed by: OBSTETRICS & GYNECOLOGY

## 2023-12-20 PROCEDURE — 76821 MIDDLE CEREBRAL ARTERY ECHO: CPT

## 2023-12-20 PROCEDURE — 76815 OB US LIMITED FETUS(S): CPT

## 2023-12-20 DEVICE — INTERCEED XL: Type: IMPLANTABLE DEVICE | Status: FUNCTIONAL

## 2023-12-20 RX ORDER — IBUPROFEN 600 MG/1
600 TABLET ORAL EVERY 6 HOURS
Status: DISCONTINUED | OUTPATIENT
Start: 2023-12-21 | End: 2023-12-20

## 2023-12-20 RX ORDER — SIMETHICONE 80 MG
80 TABLET,CHEWABLE ORAL 3 TIMES DAILY PRN
Status: DISCONTINUED | OUTPATIENT
Start: 2023-12-20 | End: 2023-12-24

## 2023-12-20 RX ORDER — CEFAZOLIN SODIUM/WATER 2 G/20 ML
SYRINGE (ML) INTRAVENOUS
Status: DISPENSED
Start: 2023-12-20 | End: 2023-12-21

## 2023-12-20 RX ORDER — ONDANSETRON 2 MG/ML
4 INJECTION INTRAMUSCULAR; INTRAVENOUS EVERY 6 HOURS PRN
Status: DISCONTINUED | OUTPATIENT
Start: 2023-12-20 | End: 2023-12-24

## 2023-12-20 RX ORDER — SODIUM CHLORIDE, SODIUM LACTATE, POTASSIUM CHLORIDE, CALCIUM CHLORIDE 600; 310; 30; 20 MG/100ML; MG/100ML; MG/100ML; MG/100ML
INJECTION, SOLUTION INTRAVENOUS CONTINUOUS
Status: DISCONTINUED | OUTPATIENT
Start: 2023-12-20 | End: 2023-12-20

## 2023-12-20 RX ORDER — DEXTROSE, SODIUM CHLORIDE, SODIUM LACTATE, POTASSIUM CHLORIDE, AND CALCIUM CHLORIDE 5; .6; .31; .03; .02 G/100ML; G/100ML; G/100ML; G/100ML; G/100ML
INJECTION, SOLUTION INTRAVENOUS CONTINUOUS
Status: DISCONTINUED | OUTPATIENT
Start: 2023-12-20 | End: 2023-12-20

## 2023-12-20 RX ORDER — NALBUPHINE HYDROCHLORIDE 10 MG/ML
2.5 INJECTION, SOLUTION INTRAMUSCULAR; INTRAVENOUS; SUBCUTANEOUS EVERY 4 HOURS PRN
Status: DISCONTINUED | OUTPATIENT
Start: 2023-12-20 | End: 2023-12-24

## 2023-12-20 RX ORDER — BISACODYL 10 MG
10 SUPPOSITORY, RECTAL RECTAL ONCE AS NEEDED
Status: DISCONTINUED | OUTPATIENT
Start: 2023-12-20 | End: 2023-12-24

## 2023-12-20 RX ORDER — HYDROMORPHONE HYDROCHLORIDE 1 MG/ML
0.4 INJECTION, SOLUTION INTRAMUSCULAR; INTRAVENOUS; SUBCUTANEOUS EVERY 2 HOUR PRN
Status: ACTIVE | OUTPATIENT
Start: 2023-12-20 | End: 2023-12-21

## 2023-12-20 RX ORDER — CITRIC ACID/SODIUM CITRATE 334-500MG
SOLUTION, ORAL ORAL
Status: COMPLETED
Start: 2023-12-20 | End: 2023-12-20

## 2023-12-20 RX ORDER — METOCLOPRAMIDE HYDROCHLORIDE 5 MG/ML
INJECTION INTRAMUSCULAR; INTRAVENOUS AS NEEDED
Status: DISCONTINUED | OUTPATIENT
Start: 2023-12-20 | End: 2023-12-20 | Stop reason: SURG

## 2023-12-20 RX ORDER — DIPHENHYDRAMINE HYDROCHLORIDE 50 MG/ML
12.5 INJECTION INTRAMUSCULAR; INTRAVENOUS EVERY 4 HOURS PRN
Status: DISCONTINUED | OUTPATIENT
Start: 2023-12-20 | End: 2023-12-24

## 2023-12-20 RX ORDER — DEXTROSE, SODIUM CHLORIDE, SODIUM LACTATE, POTASSIUM CHLORIDE, AND CALCIUM CHLORIDE 5; .6; .31; .03; .02 G/100ML; G/100ML; G/100ML; G/100ML; G/100ML
INJECTION, SOLUTION INTRAVENOUS CONTINUOUS PRN
Status: DISCONTINUED | OUTPATIENT
Start: 2023-12-20 | End: 2023-12-24

## 2023-12-20 RX ORDER — ACETAMINOPHEN 500 MG
1000 TABLET ORAL EVERY 6 HOURS
Status: DISCONTINUED | OUTPATIENT
Start: 2023-12-20 | End: 2023-12-24

## 2023-12-20 RX ORDER — KETOROLAC TROMETHAMINE 30 MG/ML
30 INJECTION, SOLUTION INTRAMUSCULAR; INTRAVENOUS EVERY 6 HOURS
Qty: 4 ML | Refills: 0 | Status: DISPENSED | OUTPATIENT
Start: 2023-12-21 | End: 2023-12-21

## 2023-12-20 RX ORDER — NALBUPHINE HYDROCHLORIDE 10 MG/ML
2.5 INJECTION, SOLUTION INTRAMUSCULAR; INTRAVENOUS; SUBCUTANEOUS
Status: DISCONTINUED | OUTPATIENT
Start: 2023-12-20 | End: 2023-12-20 | Stop reason: HOSPADM

## 2023-12-20 RX ORDER — HYDROMORPHONE HYDROCHLORIDE 1 MG/ML
0.4 INJECTION, SOLUTION INTRAMUSCULAR; INTRAVENOUS; SUBCUTANEOUS EVERY 5 MIN PRN
Status: DISCONTINUED | OUTPATIENT
Start: 2023-12-20 | End: 2023-12-20 | Stop reason: HOSPADM

## 2023-12-20 RX ORDER — ONDANSETRON 2 MG/ML
4 INJECTION INTRAMUSCULAR; INTRAVENOUS ONCE AS NEEDED
Status: DISCONTINUED | OUTPATIENT
Start: 2023-12-20 | End: 2023-12-20 | Stop reason: HOSPADM

## 2023-12-20 RX ORDER — OXYCODONE HYDROCHLORIDE 5 MG/1
5 TABLET ORAL EVERY 6 HOURS PRN
Status: DISCONTINUED | OUTPATIENT
Start: 2023-12-20 | End: 2023-12-24

## 2023-12-20 RX ORDER — GABAPENTIN 300 MG/1
300 CAPSULE ORAL EVERY 8 HOURS PRN
Status: DISCONTINUED | OUTPATIENT
Start: 2023-12-20 | End: 2023-12-24

## 2023-12-20 RX ORDER — IBUPROFEN 600 MG/1
600 TABLET ORAL EVERY 6 HOURS
Status: DISCONTINUED | OUTPATIENT
Start: 2023-12-21 | End: 2023-12-24

## 2023-12-20 RX ORDER — HYDROMORPHONE HYDROCHLORIDE 1 MG/ML
0.2 INJECTION, SOLUTION INTRAMUSCULAR; INTRAVENOUS; SUBCUTANEOUS EVERY 5 MIN PRN
Status: DISCONTINUED | OUTPATIENT
Start: 2023-12-20 | End: 2023-12-20 | Stop reason: HOSPADM

## 2023-12-20 RX ORDER — CALCIUM GLUCONATE 94 MG/ML
1 INJECTION, SOLUTION INTRAVENOUS ONCE AS NEEDED
Status: DISCONTINUED | OUTPATIENT
Start: 2023-12-20 | End: 2023-12-20

## 2023-12-20 RX ORDER — HYDROMORPHONE HYDROCHLORIDE 1 MG/ML
0.3 INJECTION, SOLUTION INTRAMUSCULAR; INTRAVENOUS; SUBCUTANEOUS EVERY 2 HOUR PRN
Status: DISCONTINUED | OUTPATIENT
Start: 2023-12-20 | End: 2023-12-24

## 2023-12-20 RX ORDER — KETOROLAC TROMETHAMINE 30 MG/ML
30 INJECTION, SOLUTION INTRAMUSCULAR; INTRAVENOUS ONCE
Status: COMPLETED | OUTPATIENT
Start: 2023-12-20 | End: 2023-12-20

## 2023-12-20 RX ORDER — ONDANSETRON 2 MG/ML
INJECTION INTRAMUSCULAR; INTRAVENOUS AS NEEDED
Status: DISCONTINUED | OUTPATIENT
Start: 2023-12-20 | End: 2023-12-20 | Stop reason: SURG

## 2023-12-20 RX ORDER — MORPHINE SULFATE 2 MG/ML
INJECTION, SOLUTION INTRAMUSCULAR; INTRAVENOUS AS NEEDED
Status: DISCONTINUED | OUTPATIENT
Start: 2023-12-20 | End: 2023-12-20 | Stop reason: SURG

## 2023-12-20 RX ORDER — CEFAZOLIN SODIUM/WATER 2 G/20 ML
SYRINGE (ML) INTRAVENOUS AS NEEDED
Status: DISCONTINUED | OUTPATIENT
Start: 2023-12-20 | End: 2023-12-20 | Stop reason: SURG

## 2023-12-20 RX ORDER — DIPHENHYDRAMINE HCL 25 MG
25 CAPSULE ORAL EVERY 4 HOURS PRN
Status: DISCONTINUED | OUTPATIENT
Start: 2023-12-20 | End: 2023-12-24

## 2023-12-20 RX ORDER — NIFEDIPINE 30 MG/1
30 TABLET, EXTENDED RELEASE ORAL DAILY
Status: DISCONTINUED | OUTPATIENT
Start: 2023-12-20 | End: 2023-12-24

## 2023-12-20 RX ORDER — KETOROLAC TROMETHAMINE 30 MG/ML
INJECTION, SOLUTION INTRAMUSCULAR; INTRAVENOUS
Status: COMPLETED
Start: 2023-12-20 | End: 2023-12-20

## 2023-12-20 RX ORDER — BUPIVACAINE HYDROCHLORIDE 7.5 MG/ML
INJECTION, SOLUTION INTRASPINAL AS NEEDED
Status: DISCONTINUED | OUTPATIENT
Start: 2023-12-20 | End: 2023-12-20 | Stop reason: SURG

## 2023-12-20 RX ORDER — DOCUSATE SODIUM 100 MG/1
100 CAPSULE, LIQUID FILLED ORAL
Status: DISCONTINUED | OUTPATIENT
Start: 2023-12-20 | End: 2023-12-24

## 2023-12-20 RX ORDER — NALOXONE HYDROCHLORIDE 0.4 MG/ML
0.08 INJECTION, SOLUTION INTRAMUSCULAR; INTRAVENOUS; SUBCUTANEOUS
Status: ACTIVE | OUTPATIENT
Start: 2023-12-20 | End: 2023-12-21

## 2023-12-20 RX ADMIN — ONDANSETRON 4 MG: 2 INJECTION INTRAMUSCULAR; INTRAVENOUS at 15:36:00

## 2023-12-20 RX ADMIN — BUPIVACAINE HYDROCHLORIDE 1.6 ML: 7.5 INJECTION, SOLUTION INTRASPINAL at 15:43:00

## 2023-12-20 RX ADMIN — CEFAZOLIN SODIUM/WATER 2 G: 2 G/20 ML SYRINGE (ML) INTRAVENOUS at 15:35:00

## 2023-12-20 RX ADMIN — SODIUM CHLORIDE, SODIUM LACTATE, POTASSIUM CHLORIDE, CALCIUM CHLORIDE: 600; 310; 30; 20 INJECTION, SOLUTION INTRAVENOUS at 15:35:00

## 2023-12-20 RX ADMIN — METOCLOPRAMIDE HYDROCHLORIDE 10 MG: 5 INJECTION INTRAMUSCULAR; INTRAVENOUS at 15:36:00

## 2023-12-20 RX ADMIN — MORPHINE SULFATE 0.2 MG: 2 INJECTION, SOLUTION INTRAMUSCULAR; INTRAVENOUS at 15:43:00

## 2023-12-20 NOTE — PLAN OF CARE
Problem: ANTEPARTUM/LABOR and DELIVERY  Goal: Maintain pregnancy as long as maternal and/or fetal condition is stable  Description: INTERVENTIONS:  - Maternal surveillance  - Fetal surveillance  - Monitor uterine activity  - Medications as ordered  - Bedrest  Outcome: Progressing  Goal: Anxiety is at manageable level  Description: INTERVENTIONS:  - [place] patient to unit/surroundings  - Establish a trusting relationship with patient  - Discuss possible complications or alterations in birth plan  - Explain treatment plan  - Explain testing/procedures prior to initiation  - Encourage participation in care  - Encourage verbalization of concerns/fears  - Identify coping mechanisms  - Administer/offer alternative therapies (Aroma therapy, distraction, guided imagery, massage, music therapy, therapeutic touch)  - Manage patient's environment (Avoid overstimulation of patient)  Outcome: Progressing  Goal: Demonstrates ability to cope with hospitalization/illness  Description: INTERVENTIONS:  - Encourage verbalization of feelings/concerns/expectations  - Provide quiet environment  - Assist patient to identify own strengths and abilities  - Encourage patient to set small goals for self  - Encourage participation in diversional activity  - Reinforce positive adaptation of new coping behaviors  - Include patient/family/caregiver in decisions  Outcome: Progressing     Problem: Patient/Family Goals  Goal: Patient/Family Long Term Goal  Description: Patient's Long Term Goal: safe delivery of infant      - See additional Care Plan goals for specific interventions  Outcome: Progressing  Goal: Patient/Family Short Term Goal  Description: Patient's Short Term Goal: adequate pain management    - See additional Care Plan goals for specific interventions  Outcome: Progressing

## 2023-12-20 NOTE — PROGRESS NOTES
I reviewed the monitor strip and discussed the case with Dr. Tanisha Rodrigues. I do not feel we can delay delivery for any significant time with the continuing decels. I recommend proceeding with delivery.

## 2023-12-20 NOTE — OPERATIVE REPORT
[facility]  Operative Note    Natalya Rebolledo Patient Status:  Inpatient    1990 MRN [id number]   [facility]    Hosp Day # 2 PCP None Pcp     Date of Procedure: 2023     Preoperative Diagnosis:  Intrauterine pregnancy at 29w1d   Fetal growth restriction with abnormal umbilical artery dopplers with reversed end diastolic flow  Rh isoimmunization with titer 256  Gestational hypertension  Prolonged decelerations  Breech presentation     Postoperative Diagnosis: Same - Delivered    Procedure: Primary CLASSICAL  section     Indications:  29year old  female at 28w2d with fetal growth restriction at 10th percentile with AC less than 1st percentile. Umbilical artery dopplers abnormal. She was a maternal transport [facility]. Pregnancy also significant for Rh isoimmunization and gestational hypertension without severe features, which was newly noted on admission. Today, umbilical artery demonstrated reversal of flow. MCA PSV is 1.35. Breech. This morning she was having repetitive prolonged decelerations about every 10 minutes or so, which did improve with IV fluids. BPP was 8/8 this morning. Minimal variability. Continued intermittent prolonged decelerations noted. MFM closely monitoring tracing today with me. MFM Dr. Calvin Snell recommends we proceed with delivery to avoid emergent delivery for dire fetal distress. IV magnesium sulfate bolus for neuro-protection given. Patient was counseled regarding recommendation to proceed with delivery. She is aware of the various considerations of her case that may require classical  section. Risks, benefits, alternatives discussed. She agrees to proceed. Surgeon: Krishna Cam MD       Assistant: Jadiel Olmstead MD, who was present throughout the case and was critical in ensuring adequate exposure for patient's safety and optimization of outcome.  The assistant actively assisted in retraction, exposure, hemostasis, entry/closure as well as other essential operative technical functions. Findings: Under-developed lower uterine segment. Normally shaped uterine cavity. Normal bilateral fallopian tubes and ovaries. Anterior placenta. Clear amniotic fluid. Live female infant in kalani breech presentation weight 1 lb 10.1 oz, 740 grams. APGARs 6,8,9. Nuchal cord absent. Placenta: apparently intact    Anesthesia: Spinal   Antibiotics: Cefazolin       EBL:  500 mL    Procedure:   Patient was brought to the operating room. Sequential compression devices and IV antibiotics were administered. After anesthetic was administered, the patient was prepped and draped in the usual sterile fashion. A Ashby catheter had been placed to drain the bladder. A time out was performed. After adequate level of anesthesia was ascertained, a Pfannenstiel incision was made and extended down to the level of the fascia. The fascia was incised and extended laterally with Heredia scissors. The rectus muscles were  superiorly and inferiorly. The peritoneal cavity was entered superiorly and extended inferiorly. An Flash-O self-retaining retractor was placed and the bladder flap was developed. The lower uterine segment was under developed. A vertical uterine incision was created with scalpel and blunt finger dissection. Amniotomy was performed with clear fluid. The fetus was delivered from a kalani breech presentation using standard maneuvers. Delayed cord clamping was performed. The cord was clamped and cut. The infant was placed in the radiant warmer with Neonatology present. Segment of cord for gases was collected. Cord blood was obtained. The placenta was delivered intact and sent to pathology. The uterine cavity was swept clean using a wet lap. The first layer of the hysterotomy was closed using 0 Vicryl in a running locked layer.  A second imbricating layer was placed with 0 Vicryl in running fashion. A 2-0 chromic was used to perform a baseball stitch to close the serosa in a third layer. The incision was inspected for hemostasis. Uterus, tubes and ovaries were normal in appearance. The self retaining retractor was removed. A sheet of Interceed adhesion barrier was placed. Re-inspection of the hysterotomy, peritoneum ,and rectus muscles revealed hemostasis. The parietal peritoneum was approximated with 3-0 Vicryl. The rectus muscles were re-approximated with 2-0 chromic in horizontal mattress suture. The fascia was closed with 0-Vicryl in a running fashion. The subcutaneous tissue was irrigated and any bleeding cauterized. The subcutaneous tissue was closed using 2-0 plain gut. A deep dermal layer of 2-0 plain gut was placed. The skin was closed with 4-0 Monocryl in a subcuticular fashion. The sponge and instrument counts were reported correct. RF mat performed and complete (negative). The patient tolerated the procedure and was stable to the recovery room.      Specimen: cord blood, cord segment for gases, placenta   Drains: urinary Ashby catheter to gravity, urine yellow  Condition:  stable  Complications: None apparent     Mckayla Hwang MD

## 2023-12-20 NOTE — L&D DELIVERY NOTE
Paola Suggs, Girl [[id number]]      Labor Events     labor?: No   steroids?: Full Course  Antibiotics received during labor?: Yes  Antibiotics (enter # doses in comment): cefazolin (Comment: Ancef 2gm preoperative)  Rupture date/time: 2023 160     Rupture type: AROM  Fluid color: Clear  Intrapartum & labor complications: Variable decelerations, Other - see comments  Intrapartum & labor complications comment: IUGR, abnormal umbilical artery dopplers, Rh isoimmunization, gestational hypertension         Presentation    Presentation: Breech       Operative Delivery    Operative Vaginal Delivery: N/A                      Shoulder Dystocia    Shoulder Dystocia: N/A             Anesthesia    Method: Spinal              [place] Delivery      Delivery date/time:  23 16:05:37   Delivery type: Caesarean Section  Breech presentation: Nghia Jose Juan    Details:   categorization: Primary    priority: unscheduled   Indications for : Other - Add Comments   Other Comment for Indications for : Nonreassuring fetal tracing, IUGR, abnormal dopplers   Skin incision type: 1 Pfannenstiel   Uterine Incision type: Classical      Delivery location: OR  Delivery Room Temperature: 73       Delivery Providers    Delivering Clinician: José Miguel Carter MD   Delivery personnel:  Provider Role   Zafra Oro RN Registered Nurse   Roselia Gasca RN Delivery Nurse   Jayant Acuna, [facility], MD Neonatologist   Farzaneh Galvez MD              Cord    Vessels: 3 Vessels  Complications: None  Timed cord clamping: Yes  Time in sec: 30  Cord blood disposition: to lab  Gases sent?: Yes       Resuscitation    Method: Oxygen       [place] Measurements      Weight: 740 g 1 lb 10.1 oz                        Placenta    Date/time: 2023 1608  Removal: Manual Removal  Appearance: Intact  Disposition: Pathology       Apgars Living status: Living   Apgar Scoring Key:    0 1 2    Skin color Blue or pale Acrocyanotic Completely pink    Heart rate Absent <100 bpm >100 bpm    Reflex irritability No response Grimace Cry or active withdrawal    Muscle tone Limp Some flexion Active motion    Respiratory effort Absent Weak cry; hypoventilation Good, crying              1 Minute:  5 Minute:  10 Minute:  15 Minute:  20 Minute:      Skin color: 0  1  1      Heart rate: 2  2  2      Reflex irritablity: 2  2  2      Muscle tone: 1  1  2      Respiratory effort: 1  2  2      Total: 6  8  9         Apgars assigned by: Lo Dubose MD  [place] disposition: NICU       Skin to Skin    Reason skin to skin not initiated: [place] [facility]       Vaginal Count    No data filed       Delivery (Maternal)    Episiotomy: None  Perineal lacerations: None      Vaginal laceration?: No      Cervical laceration?: No    Clitoral laceration?: No    Estimated blood loss (mL): 500            See operative report

## 2023-12-21 LAB
ALBUMIN SERPL-MCNC: 2.2 G/DL (ref 3.4–5)
ALBUMIN/GLOB SERPL: 0.7 {RATIO} (ref 1–2)
ALP LIVER SERPL-CCNC: 92 U/L
ALT SERPL-CCNC: 14 U/L
ANION GAP SERPL CALC-SCNC: 6 MMOL/L (ref 0–18)
AST SERPL-CCNC: 15 U/L (ref 15–37)
BASOPHILS # BLD AUTO: 0.03 X10(3) UL (ref 0–0.2)
BASOPHILS NFR BLD AUTO: 0.2 %
BILIRUB SERPL-MCNC: 0.4 MG/DL (ref 0.1–2)
BUN BLD-MCNC: 7 MG/DL (ref 9–23)
CALCIUM BLD-MCNC: 8.1 MG/DL (ref 8.5–10.1)
CHLORIDE SERPL-SCNC: 111 MMOL/L (ref 98–112)
CO2 SERPL-SCNC: 23 MMOL/L (ref 21–32)
CREAT BLD-MCNC: 0.62 MG/DL
EGFRCR SERPLBLD CKD-EPI 2021: 121 ML/MIN/1.73M2 (ref 60–?)
EOSINOPHIL # BLD AUTO: 0.04 X10(3) UL (ref 0–0.7)
EOSINOPHIL NFR BLD AUTO: 0.3 %
ERYTHROCYTE [DISTWIDTH] IN BLOOD BY AUTOMATED COUNT: 14.7 %
GLOBULIN PLAS-MCNC: 3.2 G/DL (ref 2.8–4.4)
GLUCOSE BLD-MCNC: 57 MG/DL (ref 70–99)
GROUP B STREP BY PCR FOR PCR OVT: NEGATIVE
HCT VFR BLD AUTO: 37.7 %
HGB BLD-MCNC: 12.3 G/DL
IMM GRANULOCYTES # BLD AUTO: 0.05 X10(3) UL (ref 0–1)
IMM GRANULOCYTES NFR BLD: 0.4 %
LYMPHOCYTES # BLD AUTO: 2.92 X10(3) UL (ref 1–4)
LYMPHOCYTES NFR BLD AUTO: 22.8 %
MCH RBC QN AUTO: 29.1 PG (ref 26–34)
MCHC RBC AUTO-ENTMCNC: 32.6 G/DL (ref 31–37)
MCV RBC AUTO: 89.3 FL
MONOCYTES # BLD AUTO: 1.26 X10(3) UL (ref 0.1–1)
MONOCYTES NFR BLD AUTO: 9.8 %
NEUTROPHILS # BLD AUTO: 8.52 X10 (3) UL (ref 1.5–7.7)
NEUTROPHILS # BLD AUTO: 8.52 X10(3) UL (ref 1.5–7.7)
NEUTROPHILS NFR BLD AUTO: 66.5 %
OSMOLALITY SERPL CALC.SUM OF ELEC: 286 MOSM/KG (ref 275–295)
PLATELET # BLD AUTO: 162 10(3)UL (ref 150–450)
POTASSIUM SERPL-SCNC: 4.2 MMOL/L (ref 3.5–5.1)
PROT SERPL-MCNC: 5.4 G/DL (ref 6.4–8.2)
RBC # BLD AUTO: 4.22 X10(6)UL
SODIUM SERPL-SCNC: 140 MMOL/L (ref 136–145)
VIT D+METAB SERPL-MCNC: 27.1 NG/ML (ref 30–100)
WBC # BLD AUTO: 12.8 X10(3) UL (ref 4–11)

## 2023-12-21 NOTE — PROGRESS NOTES
Patient transferred to mother/baby room 1107 per cart in stable condition with baby and personal belongings. Accompanied by  and staff. Report given to mother/baby RN.

## 2023-12-21 NOTE — PLAN OF CARE

## 2023-12-21 NOTE — PLAN OF CARE
Problem: GASTROINTESTINAL - ADULT  Goal: Minimal or absence of nausea and vomiting  Description: INTERVENTIONS:  - Maintain adequate hydration with IV or PO as ordered and tolerated  - Nasogastric tube to low intermittent suction as ordered  - Evaluate effectiveness of ordered antiemetic medications  - Provide nonpharmacologic comfort measures as appropriate  - Advance diet as tolerated, if ordered  - Obtain nutritional consult as needed  - Evaluate fluid balance  Outcome: Progressing  Goal: Maintains or returns to baseline bowel function  Description: INTERVENTIONS:  - Assess bowel function  - Maintain adequate hydration with IV or PO as ordered and tolerated  - Evaluate effectiveness of GI medications  - Encourage mobilization and activity  - Obtain nutritional consult as needed  - Establish a toileting routine/schedule  - Consider collaborating with pharmacy to review patient's medication profile  Outcome: Progressing     Problem: POSTPARTUM  Goal: Long Term Goal:Experiences normal postpartum course  Description: INTERVENTIONS:  - Assess and monitor vital signs and lab values. - Assess fundus and lochia. - Provide ice/sitz baths for perineum discomfort. - Monitor healing of incision/episiotomy/laceration, and assess for signs and symptoms of infection and hematoma. - Assess bladder function and monitor for bladder distention.  - Provide/instruct/assist with pericare as needed. - Provide VTE prophylaxis as needed. - Monitor bowel function.  - Encourage ambulation and provide assistance as needed. - Assess and monitor emotional status and provide social service/psych resources as needed. - Utilize standard precautions and use personal protective equipment as indicated. Ensure aseptic care of all intravenous lines and invasive tubes/drains.  - Obtain immunization and exposure to communicable diseases history.   Outcome: Progressing  Goal: Optimize infant feeding at the breast  Description: INTERVENTIONS:  - Initiate breast feeding within first hour after birth. - Monitor effectiveness of current breast feeding efforts. - Assess support systems available to mother/family.  - Identify cultural beliefs/practices regarding lactation, letdown techniques, maternal food preferences. - Assess mother's knowledge and previous experience with breast feeding.  - Provide information as needed about early infant feeding cues (e.g., rooting, lip smacking, sucking fingers/hand) versus late cue of crying.  - Discuss/demonstrate breast feeding aids (e.g., infant sling, nursing footstool/pillows, and breast pumps). - Encourage mother/other family members to express feelings/concerns, and actively listen. - Educate father/SO about benefits of breast feeding and how to manage common lactation challenges. - Recommend avoidance of specific medications or substances incompatible with breast feeding.  - Assess and monitor for signs of nipple pain/trauma. - Instruct and provide assistance with proper latch. - Review techniques for milk expression (breast pumping) and storage of breast milk. Provide pumping equipment/supplies, instructions and assistance, as needed. - Encourage rooming-in and breast feeding on demand.  - Encourage skin-to-skin contact. - Provide LC support as needed. - Assess for and manage engorgement. - Provide breast feeding education handouts and information on community breast feeding support. Outcome: Progressing  Goal: Establishment of adequate milk supply with medication/procedure interruptions  Description: INTERVENTIONS:  - Review techniques for milk expression (breast pumping). - Provide pumping equipment/supplies, instructions, and assistance until it is safe to breastfeed infant. Outcome: Progressing  Goal: Appropriate maternal -  bonding  Description: INTERVENTIONS:  - Assess caregiver- interactions. - Assess caregiver's emotional status and coping mechanisms.   - Encourage caregiver to participate in  daily care. - Assess support systems available to mother/family.  - Provide /case management support as needed.   Outcome: Progressing

## 2023-12-22 PROBLEM — E55.9 VITAMIN D DEFICIENCY: Status: ACTIVE | Noted: 2023-12-22

## 2023-12-22 RX ORDER — CHOLECALCIFEROL (VITAMIN D3) 1250 MCG
1 CAPSULE ORAL WEEKLY
Qty: 8 CAPSULE | Refills: 0 | Status: SHIPPED | OUTPATIENT
Start: 2023-12-22 | End: 2024-02-10

## 2023-12-22 NOTE — PROGRESS NOTES
Received report from [facility]. Pt resting comfortably, VSS, no c/o blurred vision, epigastric pain or a headache. Plan of care reviewed with pt.

## 2023-12-22 NOTE — CM/SW NOTE
SW order acknowledged. SW met with patient's patient and  to complete assessment and offer support, as baby girl admitted to NICU. Patient and  presented with a cheerful affect. Patient report they [place] in [place], and this is their first baby. Patient report strong support from her family, friends, and sps. Patient reports she stopped working 2 weeks ago, and sps is still working but his employer is understanding of situation. Patient reports hx of anxiety, and depression. Patient reports she has a therapist whom she has been seeing 1x/wk for approximately. Patient reports she is currently doing okay. SW provided support and normalization. SW reviewed support services for the NICU including [facility] family room and sleep room areas, NICU Facebook page, NICU support group and role of NICU  with contact information. SW reviewed Postpartum Depression warning signs and support services. SW encouraged patient to reach out to her OBGYN/PCP with any further PPD/PPA questions, concerns or need for support. Patient and  thanked EDER for visit, no further immediate needs expressed at this time.      Von Jarrett LCSW  /Discharge Planner

## 2023-12-22 NOTE — CM/SW NOTE
met with patient Garry Ashford) and spouse Edna Abbott) to review insurance and PCP for infant. Infant will be added to [address] that [place] delivered under.  reviewed insurance Auth process and discharge planning needs. Patient would like a list of providers from their in network insurance provider.  will print for family. Patient plans on breast providing breast milk for infant and patient is working on requesting breast pump from insurance. Couple have crib for infant and are going to get car seat before infant is discharged from NICU.  reviewed [facility] rooms and also reviewed resources in [employer] such as diaper khalil or drives, [facility], and food pantry's if family has any needs. Family feel that they are doing okay at this time.  provided name and office phone number.

## 2023-12-23 PROBLEM — R10.2 PELVIC PAIN: Status: ACTIVE | Noted: 2020-12-10

## 2023-12-23 RX ORDER — IBUPROFEN 600 MG/1
600 TABLET ORAL EVERY 6 HOURS
Qty: 30 TABLET | Refills: 0 | Status: SHIPPED | OUTPATIENT
Start: 2023-12-23

## 2023-12-23 RX ORDER — PSEUDOEPHEDRINE HCL 30 MG
100 TABLET ORAL 2 TIMES DAILY PRN
Qty: 30 CAPSULE | Refills: 0 | Status: SHIPPED | OUTPATIENT
Start: 2023-12-23

## 2023-12-23 RX ORDER — ACETAMINOPHEN 500 MG
500 TABLET ORAL EVERY 6 HOURS PRN
Qty: 30 TABLET | Refills: 0 | Status: SHIPPED | OUTPATIENT
Start: 2023-12-23

## 2023-12-23 RX ORDER — NIFEDIPINE 30 MG/1
30 TABLET, FILM COATED, EXTENDED RELEASE ORAL DAILY
Qty: 30 TABLET | Refills: 0 | Status: SHIPPED | OUTPATIENT
Start: 2023-12-23

## 2023-12-23 NOTE — DISCHARGE SUMMARY
[facility]  Discharge Summary    Mandy Cosby Patient Status:  inpatient    1990 MRN [id number]   Location 2199/2199-A Attending [facility] Karina Alvarez Day # 5 PCP None Pcp     Date of Admission: 2023    Date of Discharge: 23     Admitting Diagnosis:   Barak Mandujano IUP   Present on Admission:   IUGR (intrauterine growth restriction) affecting care of mother, third trimester, fetus 1   34 weeks gestation of pregnancy   Pregnancy with prenatal care elsewhere in third trimester   Rh negative state in antepartum period   Rh alloimmunization, maternal, antepartum, third trimester, fetus 1   Gestational hypertension, third trimester       Discharge Diagnosis:   S/p classical CS of liveborn infant at [facility] Course: Patient admitted for FGR with abnormal UA dopplers. Decision was made to proceed with delivery due to nonreassuring fetal testing. She received  steroids and magnesium sulfate. Please refer to operative note for further details, . Female infant, Nabila Solum , admitted to NICU. Post-operatively she did well, meeting all milestones: her pain is well controlled, she is ambulating and voiding without difficulty, she is tolerating a general diet. She was discharged to home POD#4 in stable condition. Consultations: Maternal Fetal Medicine    Procedures: classical CS    Complications: None    Discharge Condition: Stable    Discharge Medications: Current Discharge Medication List       Medication List        START taking these medications      acetaminophen 500 MG Tabs  Commonly known as: Tylenol Extra Strength  Take 1 tablet (500 mg total) by mouth every 6 (six) hours as needed for Pain. docusate sodium 100 MG Caps  Commonly known as: COLACE  Take 100 mg by mouth 2 (two) times daily as needed for constipation. ibuprofen 600 MG Tabs  Commonly known as: Motrin  Take 1 tablet (600 mg total) by mouth every 6 (six) hours.      NIFEdipine ER 30 MG Tb24  Commonly known as: Adalat CC  Take 1 tablet (30 mg total) by mouth daily. Vitamin D3 1.25 MG (75314 UT) Caps  Take 1 tablet by mouth once a week for 8 doses. Then switch to vitamin D3 2,000 IU daily. CONTINUE taking these medications      prenatal vitamin with DHA 27-0.8-228 MG Caps            STOP taking these medications      doxylamine-pyridoxine 10-10 MG Tbec  Commonly known as: Diclegis     Ferrous Sulfate 325 (65 Fe) MG Tabs               Where to Get Your Medications        These medications were sent to [address], [address] AT [address] [place] [address], [phone], [address] [facility],[address], [facility] [address]      Phone: [phone]   acetaminophen 500 MG Tabs  docusate sodium 100 MG Caps  ibuprofen 600 MG Tabs  NIFEdipine ER 30 MG Tb24  Vitamin D3 1.25 MG (32191 UT) Caps         Follow up Visits: Follow-up with [facility]. Return precautions reviewed.     Gladys Ayoub MD  [facility]  12/23/2023 1:46 PM

## 2023-12-24 VITALS
SYSTOLIC BLOOD PRESSURE: 125 MMHG | BODY MASS INDEX: 25 KG/M2 | TEMPERATURE: 98 F | OXYGEN SATURATION: 96 % | WEIGHT: 157 LBS | RESPIRATION RATE: 16 BRPM | DIASTOLIC BLOOD PRESSURE: 87 MMHG | HEART RATE: 77 BPM

## 2023-12-24 NOTE — BH PROGRESS NOTE
[facility] PPD SCREENING    Reason for Referral: PT scored a 10 on EDPS. PT reports some anxiousness and sadness due to the situation. Current Stressors:    History of PPD: PT denied   Financial: PT denied any concerns. PT stated that she recently left her job and her and her  have worked out all the financial details   Other: None    Mental Health Status:    Current Symptoms: PT stated that she is tearful about her baby being in the NICU but stated that she has a strong support network   Appearance/General Behavior: PT was calm and cooperative and appropriately dressed and groomed   General Cognitive Functioning: PT was alert and oriented x4   Thought Process: Intact   Thought Content: Clear    Mood/Affect: PT appeared to be content and affect was stable and appropriate   Orientation: PT was alert and oriented x4   Speech: PT's speech was appropriate and clear   Homicidal/Suicidal Ideation/Plan: PT denied suicidal ideation, plan, or intent. PT denied homicidal ideation, plan, or intent    Living Arrangement:    Who Resides at Home: PT lives with her , her 's grandmother and there cat. Has other Children: PT has no other children    Is Father of the Baby involved: Yes    Has Familial Support: Yes    Has Other Support Network: PT and PT's  stated that they have a strong support network of friends    Plan:    Provide PPD Information:     Postpartum Depression Resources Given: PT received    Cradle Talk Information Given: PT received    Depression During and After Pregnancy Information given: PT received   Provide [facility] Contact Information: PT received   Other Information Given: PT declined outside therapy referrals. PT stated that she has an outpatient therapist that she sees once a week. PT stated that her next appointment is on January 2nd.

## 2023-12-26 ENCOUNTER — TELEPHONE (OUTPATIENT)
Dept: OBGYN UNIT | Facility: HOSPITAL | Age: 33
End: 2023-12-26

## 2023-12-26 ENCOUNTER — NURSE ONLY (OUTPATIENT)
Dept: OBGYN CLINIC | Facility: CLINIC | Age: 33
End: 2023-12-26

## 2023-12-26 VITALS — DIASTOLIC BLOOD PRESSURE: 85 MMHG | SYSTOLIC BLOOD PRESSURE: 137 MMHG

## 2023-12-26 DIAGNOSIS — Z01.30 BP CHECK: Primary | ICD-10-CM

## 2023-12-26 PROCEDURE — 3079F DIAST BP 80-89 MM HG: CPT | Performed by: OBSTETRICS & GYNECOLOGY

## 2023-12-26 PROCEDURE — 3075F SYST BP GE 130 - 139MM HG: CPT | Performed by: OBSTETRICS & GYNECOLOGY

## 2023-12-26 NOTE — PROGRESS NOTES
Vitals:    12/26/23 0942   BP: 137/85     Patient here for BP check, has not started Nifedipine. Will  today, denies HA, vision changes or other symptoms besides incision discomfort. Patient advised to start monitoring BP and call office if BP readings above 150/90 or develops new/worsening symptoms. Verbalized understanding and agreed.

## 2023-12-27 ENCOUNTER — TELEPHONE (OUTPATIENT)
Dept: OBGYN UNIT | Facility: HOSPITAL | Age: 33
End: 2023-12-27

## 2023-12-27 NOTE — PROGRESS NOTES
Reviewed self and infant care w / mom, she verbalizes understanding of instructions reviewed. Encourage to follow up w/ MDs as directed and w/ questions/concerns. Had bp check yesterday and med remains the same, sx of PIH reviewed, E=10, states she's feeling better, not crying as much, getting used to the routine, pumping regularly, care reviewed and explained access to lac and SW while baby is a pt in NICU. Reviewed after hops options but will send her all the cradle call letters for her to refer to.  Is currently staying in [address] [facility] room for easier access to NICU

## 2024-01-04 ENCOUNTER — TELEPHONE (OUTPATIENT)
Facility: CLINIC | Age: 34
End: 2024-01-04

## 2024-01-09 ENCOUNTER — TELEPHONE (OUTPATIENT)
Dept: OBGYN CLINIC | Facility: CLINIC | Age: 34
End: 2024-01-09

## 2024-01-09 NOTE — TELEPHONE ENCOUNTER
Pt cancelled post partum appt with Dr. Fuller. Rescheduled with Kimberly on 1/16. Pt stated she delivered at [facility]; not with our providers. No need to call Pt unless she needs to see different provider.

## 2024-01-11 ENCOUNTER — TELEPHONE (OUTPATIENT)
Dept: OBGYN CLINIC | Facility: CLINIC | Age: 34
End: 2024-01-11

## 2024-01-16 ENCOUNTER — POSTPARTUM (OUTPATIENT)
Dept: OBGYN CLINIC | Facility: CLINIC | Age: 34
End: 2024-01-16
Payer: COMMERCIAL

## 2024-01-16 VITALS
DIASTOLIC BLOOD PRESSURE: 78 MMHG | BODY MASS INDEX: 23.74 KG/M2 | HEART RATE: 64 BPM | WEIGHT: 151.25 LBS | HEIGHT: 67 IN | SYSTOLIC BLOOD PRESSURE: 124 MMHG

## 2024-01-16 PROCEDURE — 3078F DIAST BP <80 MM HG: CPT | Performed by: STUDENT IN AN ORGANIZED HEALTH CARE EDUCATION/TRAINING PROGRAM

## 2024-01-16 PROCEDURE — 3074F SYST BP LT 130 MM HG: CPT | Performed by: STUDENT IN AN ORGANIZED HEALTH CARE EDUCATION/TRAINING PROGRAM

## 2024-01-16 PROCEDURE — 3008F BODY MASS INDEX DOCD: CPT | Performed by: STUDENT IN AN ORGANIZED HEALTH CARE EDUCATION/TRAINING PROGRAM

## 2024-01-16 NOTE — PROGRESS NOTES
HPI    Mandy Olsen is a 33 year old female  here for 6 week post-partum visit.  Patient delivered a  female infant on 2023 via primary CSx.  Delivery at 29 weeks due to FGR and non-reassuring fetal testing.  Delivered at [facility]. Infant still in NICU, has been doing well.    Pt states she is voiding freely, tolerating general diet, having normal bowel movements and minimal locia.  Pt has not been sexual active.  Patient interested in contraception.    Patient is breast & bottle feeding.   Pt denies any significant breast tenderness/engorgement.  Patient has symptoms of post partum depression, Patient has been seeing therapist at [facility].      [place]  DEPRESSION SCALE       OBSTETRIC HISTORY  OB History    Para Term  AB Living   1 1 0 1 0 1   SAB IAB Ectopic Multiple Live Births   0 0 0 0 1      # Outcome Date GA Lbr [place]/2nd Weight Sex Delivery Anes PTL Lv   1  23 29w1d  1 lb 10.1 oz (0.74 kg) F Caesarean Spinal N JAK      Complications: Variable decelerations, Other - see comments      Obstetric Comments   2023 - female \"Cici\" - Maternal transport from [place], FGR (EFW 10%, AC<1%) with abnormal UA dopplers (REDV). Rh isoimmunization diagnosed 23 with titer 256 (had not received Rhogam, denied vaginal bleeding or trauma during the pregnancy, 11/15/23 Ab screen negative.) Anxiety & depression. Dx GHTN without severe features on admission to [place]. Primary CLASSICAL  section for breech, non reassuring fetal status with minimal variability and multiple prolonged decelerations in setting of FGR with REDV. Nifedipine 30 mg XR daily started immediately postpartum.  Placental path: <10th percentile by weight, multiple subacute placental infarcts ~ 10% placental volume, focal chronic villitis of unknown etiology (THANH).       GYNE HISTORY        MEDICATIONS:    Current Outpatient Medications:     acetaminophen 500 MG Oral  Tab, Take 1 tablet (500 mg total) by mouth every 6 (six) hours as needed for Pain., Disp: 30 tablet, Rfl: 0    docusate sodium 100 MG Oral Cap, Take 100 mg by mouth 2 (two) times daily as needed for constipation., Disp: 30 capsule, Rfl: 0    ibuprofen 600 MG Oral Tab, Take 1 tablet (600 mg total) by mouth every 6 (six) hours., Disp: 30 tablet, Rfl: 0    Cholecalciferol (VITAMIN D3) 1.25 MG (56970 UT) Oral Cap, Take 1 tablet by mouth once a week for 8 doses. Then switch to vitamin D3 2,000 IU daily., Disp: 8 capsule, Rfl: 0    prenatal vitamin with DHA 27-0.8-228 MG Oral Cap, Take 1 capsule by mouth daily., Disp: , Rfl:     NIFEdipine ER 30 MG Oral Tablet 24 Hr, Take 1 tablet (30 mg total) by mouth daily. (Patient not taking: Reported on 2024), Disp: 30 tablet, Rfl: 0    ALLERGIES:    Allergies   Allergen Reactions    Seasonal OTHER (SEE COMMENTS)     Itchy eyes, congestion, sneezing       PHYSICAL EXAM  Last menstrual period 2023, currently breastfeeding.  General:  Well nourished, well developed woman in no acute distress  Abdomen:  soft, nontender, no masses,  scar completely healed.      ASSESSMENT/PLAN  33 year old female  here for post-partum visit    Discussed return to fertility and menses.  Patient counseled on contraceptive options.  Patient has chosen  to think about it and let us know .  Patient to return  for annual.

## 2024-10-09 NOTE — PROGRESS NOTES
Mandy Olsen is a 34 year old female.    HPI:       Mandy Olsen is a 34 year old female.   Patient's last menstrual period was 10/05/2024 (exact date).    Chief Complaint   Patient presents with    Annual     Annual new patient, last pap 09/15/2023   Pt is also have stomach pain, and pain around her heart with shortness of breath. Pt also would like a refill on iron meds. Pt declined flu vaccine        Tuesday was feeling like forgetting to breathe and was a little painful in chest.  Uncertain if it was anxiety attack. Has not had anxiety attack before. Not feeling that way currently.     Denies abnormal discharge or vaginal irritation.   Periods have been painful and heavier since delivery. Sometimes takes tylenol or Ibuprofen. Does help. Periods were heavy and painful before but seem to be worse.   Periods regular lasting 4-5 days. Heavy x 2 days, chaning pad/tampon every 2 hrs.   A little dizziness during periods.       Daughter is doing great at home. Finished pumping/nursing end of July.      Hx Prior Abnormal Pap: No  Pap Date: 09/15/23  Pap Result Notes: normal    Menarche: 14 years  Period Cycle (Days): monthly  Period Duration (Days): 4-5  Period Flow: heavy and painful  Use of Birth Control (if yes, specify type): None  Hx Prior Abnormal Pap: No  Pap Date: 09/15/23  Pap Result Notes: normal    Nothing for birth control. Using condoms most of the time.     Current partner x 8 years. Monogamous relationship.   Feels safe. Denies abuse  Declines STD testing    Last pap: 2023, NIL, HPV Negative. Hx of abnormal pap denies    Diet: well balanced  Exercise:once this week. Working out a few times a week typically.     Denies excessive ETOH use, no marijuana, smoking, vapping or any non prescriptive drug use.     Family hx of breast, enodmetrial or ovarian CA: No    HPV Vaccine:no    Note: This is a gyn only visit. Pt  is referred back to PCP for care of any non-gyn concerns  listed above and  in the Problem List.      PHQ-2 SCORE: 2  , done 6/3/2024   Little interest or pleasure in doing things: 1    Feeling down, depressed, or hopeless: 1    Trouble falling or staying asleep, or sleeping too much: 1 (Some difficulties falling asleep)     Feeling bad about yourself - or that you are a failure or have let yourself or your family down: 1    If you checked off any problems, how difficult have these problems made it for you to do your work, take care of things at home, or get along with other people?: Not difficult at all    Last [place] Suicide Screening on 10/11/2024 was No Risk.       Depression Screening (PHQ-2/PHQ-9): Over the LAST 2 WEEKS   Little interest or pleasure in doing things (over the last two weeks)?: Several days    Feeling down, depressed, or hopeless (over the last two weeks)?: Several days    PHQ-2 SCORE: 2           HISTORY:  Past Medical History:    Anxiety and depression    Chickenpox    COVID-19    positive test    Fetal growth restriction antepartum (HCC)    early onset FGR with abnormal doppler (REDV) at 28+ wk    Gestational hypertension, third trimester (HCC)    noted at 28+ wk    History of classical  section    CLASSICAL  at 29w1d. Fetal growth restriction with REDV, decelerations    History of pelvic ultrasound    12/10/2020 Pelvic US for pelvic pain - unremarkable. CareEverywhere    Pap smear for cervical cancer screening    Pap & HPV negative    Rh alloimmunization, maternal, antepartum, third trimester, fetus 1 (HCC)    11/15/23 Ab screen negative. 23 Anti-D titer 256.    Vitamin D deficiency      Past Surgical History:   Procedure Laterality Date      2023    CLASSICAL  at 29w1d. Fetal growth restriction with REDV, decelerations, breech. Dr. Natividad Avila, [facility]      Family History   Problem Relation Age of Onset    Diabetes Maternal Grandmother     High Cholesterol Maternal Grandmother      Diabetes Maternal Grandfather     High Cholesterol Maternal Grandfather     Other (IUGR/SGA) Daughter     Genetic Disease Neg       Social History:   Social History     Socioeconomic History    Marital status:    Tobacco Use    Smoking status: Never    Smokeless tobacco: Never   Vaping Use    Vaping status: Never Used   Substance and Sexual Activity    Alcohol use: Not Currently     Comment: socially    Drug use: Not Currently     Types: Cannabis     Social Drivers of Health     Financial Resource Strain: Low Risk  (2023)    Financial Resource Strain     Difficulty of Paying Living Expenses: Not hard at all     Med Affordability: No   Food Insecurity: No Food Insecurity (2023)    Food Insecurity     Food Insecurity: Never true   Transportation Needs: No Transportation Needs (2023)    Transportation Needs     Lack of Transportation: No   Stress: No Stress Concern Present (2023)    Stress     Feeling of Stress : No   Housing Stability: Low Risk  (2023)    Housing Stability     Housing Instability: No        OB History    Para Term  AB Living   1 1 0 1 0 1   SAB IAB Ectopic Multiple Live Births   0 0 0 0 1   Obstetric Comments   2023 - female \"Cici\" - Maternal transport from [place], FGR (EFW 10%, AC<1%) with abnormal UA dopplers (REDV). Rh isoimmunization diagnosed 23 with titer 256 (had not received Rhogam, denied vaginal bleeding or trauma during the pregnancy, 11/15/23 Ab screen negative.) Anxiety & depression. Dx GHTN without severe features on admission to [facility]. Primary CLASSICAL  section for breech, non reassuring fetal status with minimal variability and multiple prolonged decelerations in setting of FGR with REDV. Nifedipine 30 mg XR daily started immediately postpartum.  Placental path: <10th percentile by weight, multiple subacute placental infarcts ~ 10% placental volume, focal chronic villitis of unknown etiology (THANH).        Medications (Active prior to today's visit):  Current Outpatient Medications   Medication Sig Dispense Refill    Norethindrone Acet-Ethinyl Est 1.5-30 MG-MCG Oral Tab Take 1 tablet by mouth daily. 84 tablet 0    acetaminophen 500 MG Oral Tab Take 1 tablet (500 mg total) by mouth every 6 (six) hours as needed for Pain. 30 tablet 0    prenatal vitamin with DHA 27-0.8-228 MG Oral Cap Take 1 capsule by mouth daily.      docusate sodium 100 MG Oral Cap Take 100 mg by mouth 2 (two) times daily as needed for constipation. (Patient not taking: Reported on 10/11/2024) 30 capsule 0    ibuprofen 600 MG Oral Tab Take 1 tablet (600 mg total) by mouth every 6 (six) hours. (Patient not taking: Reported on 10/11/2024) 30 tablet 0    NIFEdipine ER 30 MG Oral Tablet 24 Hr Take 1 tablet (30 mg total) by mouth daily. (Patient not taking: Reported on 10/11/2024) 30 tablet 0       Allergies:  Allergies[1]      ROS:     Review of Systems   HENT: Negative.     Respiratory: Negative.          Had episode earlier this week where felt forgot to breathe or couldn't breathe well and felt some pain in chest. Resolved and not feeling currently     Cardiovascular: Negative.    Gastrointestinal: Negative.    Genitourinary:  Positive for menstrual problem (heavy periods) and pelvic pain (cramping with period). Negative for decreased urine volume, difficulty urinating, dyspareunia, dysuria, enuresis, flank pain, frequency, genital sores, hematuria, urgency, vaginal bleeding, vaginal discharge and vaginal pain.   Neurological:  Positive for dizziness (occasional with periods). Negative for syncope.   Psychiatric/Behavioral: Negative.           PHYSICAL EXAM:     Vitals:    10/11/24 0754   BP: 127/83   Pulse: 73     Physical Exam  Vitals reviewed.   Constitutional:       General: She is not in acute distress.     Appearance: Normal appearance. She is well-developed. She is not ill-appearing, toxic-appearing or diaphoretic.   HENT:      Head:  Normocephalic.   Neck:      Thyroid: No thyroid mass, thyromegaly or thyroid tenderness.   Cardiovascular:      Rate and Rhythm: Normal rate and regular rhythm.      Heart sounds: Normal heart sounds. No murmur heard.  Pulmonary:      Effort: Pulmonary effort is normal.      Breath sounds: Normal breath sounds.   Chest:   Breasts:     Breasts are symmetrical.      Right: No swelling, bleeding, inverted nipple, mass, nipple discharge, skin change or tenderness.      Left: No swelling, bleeding, inverted nipple, mass, nipple discharge, skin change or tenderness.   Abdominal:      Palpations: Abdomen is soft.      Tenderness: There is no abdominal tenderness. There is no right CVA tenderness or left CVA tenderness.   Genitourinary:     General: Normal vulva.      Pubic Area: No rash or pubic lice.       Labia:         Right: No rash, tenderness, lesion or injury.         Left: No rash, tenderness, lesion or injury.       Urethra: No prolapse, urethral pain, urethral swelling or urethral lesion.      Vagina: Normal. No signs of injury and foreign body. No vaginal discharge, erythema, tenderness, bleeding, lesions or prolapsed vaginal walls.      Cervix: No cervical motion tenderness, discharge, friability, lesion, erythema, cervical bleeding or eversion.      Uterus: Not deviated, not enlarged, not fixed, not tender and no uterine prolapse.       Adnexa:         Right: No mass, tenderness or fullness.          Left: No mass, tenderness or fullness.     Musculoskeletal:         General: Normal range of motion.   Lymphadenopathy:      Upper Body:      Right upper body: No axillary adenopathy.      Left upper body: No axillary adenopathy.      Lower Body: No right inguinal adenopathy. No left inguinal adenopathy.   Neurological:      Mental Status: She is alert and oriented to person, place, and time.   Psychiatric:         Mood and Affect: Mood normal.         Behavior: Behavior normal. Behavior is cooperative.           ASSESSMENT/PLAN:      Diagnoses and all orders for this visit:    Women's annual routine gynecological examination    Screening for deficiency anemia  -     CBC, Platelet; No Differential; Future    Menorrhagia with regular cycle    Need for HPV vaccination    Need for influenza vaccination    Other orders  -     Discontinue: fluconazole (DIFLUCAN) 150 MG Oral Tab; Take 1 tablet (150 mg total) by mouth every 3 (three) days for 2 doses.  -     Fluzone trivalent vaccine, PF 0.5mL, 6mo+ (86425)  -     Norethindrone Acet-Ethinyl Est 1.5-30 MG-MCG Oral Tab; Take 1 tablet by mouth daily.  -     GARDASIL 9        Normal gyne exam.Pap UTD, pap guidelines reviewed  STD testing: Declines  Discussed breast awareness    Vaccines offered today: Gardasil #1 and Flu vaccine per nurse today        Pt counseled on possible etiologies of heavy periods and/or irregular bleeding including uterine fibroids, DUB/anovulatory bleeding and other benign and less common malignant etiologies. She recently had pregnancy with ultrasound and  with no abnormalities noted    Discussed treatment options including medical and surgical.  Medical options include OCPs, Nuvaring, patch for cycle control along with depo provera for menstrual suppression.  Discussed Mirena IUD and menstrual benefits.  Pt counseled on risks/benefits of each of the appropriate options. Desires to start on OCP's    Birth control- Rx OCP's to pharmacy.   Risks of OCP's especially DVT, elevated blood pressure, and failure resulting in pregnancy were reviewed.  Increased risk for heart attack and stroke especially with tobacco use was also discussed.  Potential side effects and non-contraceptive benefits were reviewed.  ACHES reviewed. Start today. Back up method x 1 wk until effective. She was also counseled on the use of condoms to decrease the risk of pregnancy and sexually transmitted diseases.     CBC ordered to check for anemia    Recommend she f/u with PCP for  evaluation of medical concern especially in light of starting OCP's. List of PCP's given    F/u in 3 months             10/9/2024  My Owens CNM          [1]   Allergies  Allergen Reactions    Seasonal OTHER (SEE COMMENTS)     Itchy eyes, congestion, sneezing

## 2024-10-11 ENCOUNTER — OFFICE VISIT (OUTPATIENT)
Dept: OBGYN CLINIC | Facility: CLINIC | Age: 34
End: 2024-10-11
Payer: COMMERCIAL

## 2024-10-11 VITALS
SYSTOLIC BLOOD PRESSURE: 127 MMHG | HEART RATE: 73 BPM | HEIGHT: 67 IN | DIASTOLIC BLOOD PRESSURE: 83 MMHG | BODY MASS INDEX: 24 KG/M2

## 2024-10-11 DIAGNOSIS — N92.0 MENORRHAGIA WITH REGULAR CYCLE: ICD-10-CM

## 2024-10-11 DIAGNOSIS — Z23 NEED FOR INFLUENZA VACCINATION: ICD-10-CM

## 2024-10-11 DIAGNOSIS — Z23 NEED FOR HPV VACCINATION: ICD-10-CM

## 2024-10-11 DIAGNOSIS — Z13.0 SCREENING FOR DEFICIENCY ANEMIA: ICD-10-CM

## 2024-10-11 DIAGNOSIS — Z01.419 WOMEN'S ANNUAL ROUTINE GYNECOLOGICAL EXAMINATION: Primary | ICD-10-CM

## 2024-10-11 LAB
CONTROL LINE PRESENT WITH A CLEAR BACKGROUND (YES/NO): YES YES/NO
KIT LOT #: NORMAL NUMERIC
PREGNANCY TEST, URINE: NEGATIVE

## 2024-10-11 PROCEDURE — 81025 URINE PREGNANCY TEST: CPT | Performed by: ADVANCED PRACTICE MIDWIFE

## 2024-10-11 PROCEDURE — 3079F DIAST BP 80-89 MM HG: CPT | Performed by: ADVANCED PRACTICE MIDWIFE

## 2024-10-11 PROCEDURE — 90472 IMMUNIZATION ADMIN EACH ADD: CPT | Performed by: ADVANCED PRACTICE MIDWIFE

## 2024-10-11 PROCEDURE — 90471 IMMUNIZATION ADMIN: CPT | Performed by: ADVANCED PRACTICE MIDWIFE

## 2024-10-11 PROCEDURE — 99395 PREV VISIT EST AGE 18-39: CPT | Performed by: ADVANCED PRACTICE MIDWIFE

## 2024-10-11 PROCEDURE — 3074F SYST BP LT 130 MM HG: CPT | Performed by: ADVANCED PRACTICE MIDWIFE

## 2024-10-11 PROCEDURE — 90651 9VHPV VACCINE 2/3 DOSE IM: CPT | Performed by: ADVANCED PRACTICE MIDWIFE

## 2024-10-11 PROCEDURE — 90656 IIV3 VACC NO PRSV 0.5 ML IM: CPT | Performed by: ADVANCED PRACTICE MIDWIFE

## 2024-10-11 RX ORDER — FLUCONAZOLE 150 MG/1
150 TABLET ORAL
Qty: 2 TABLET | Refills: 0 | Status: SHIPPED | OUTPATIENT
Start: 2024-10-11 | End: 2024-10-11 | Stop reason: CLARIF

## 2024-10-11 RX ORDER — NORETHINDRONE ACETATE AND ETHINYL ESTRADIOL .03; 1.5 MG/1; MG/1
1 TABLET ORAL DAILY
Qty: 84 TABLET | Refills: 0 | Status: SHIPPED | OUTPATIENT
Start: 2024-10-11 | End: 2025-10-11

## 2024-10-11 NOTE — PATIENT INSTRUCTIONS
ORAL CONTRACEPTIVE PILL INSTRUCTION    The name of my oral contraceptive pill is  Norethindrone Ethinyl Estradiol    When do I start my “pills”?  On the day your period starts  The Sunday after your period starts whether or not your period has already stopped.  If our period begins on Sunday, take your first pill that day.  5 days after your period begins.    What time should I take my pill?  Take your pill the same time every day.  This will improve it’s effectiveness and will help you remember to take your pill daily.’  If you have nausea when taking your pill, take it at bedtime with some food    What kind of side effects can occur and what can I do about them?  Nausea - take the pill with food and eat a bit more in an hour.  Take the pill at bedtime with a snack.  Bloating, breast tenderness - decrease caffeine intake, especially during the third week of pills.  Take a multivitamin daily.  Bleeding between periods - take the pill at the same time daily and use a back-up method of contraception.  Some spotting or breakthrough bleeding during the first 3 months is normal.  If bleeding persists after the first three months, call the office.  Mood changes - take a multivitamin daily.  If persists past he third pack of pills discuss this with your health care provider.    What can decrease the effectiveness of the pill  Forgetting to take the pill.  An illness that causes vomiting and/or diarrhea.  If you vomit within3 hour of taking your pill, take another pill from your pack and call the office to get an additional pack of pills.  Some medications - antibiotics, anticonvulsants, sedatives, antidepressants, and Marie’s Wort    What should I do if I forget to take my pills  One pill - take as soon as you remember if not until the next day take two pills that day.  Two pills - take 2 pills a day for 2 days in a row, by the third day you will be back on schedule  If you miss pills you should use a back-up method of  contraception, such as condoms until your next pack of pills.  If you miss more than 2 pills.  Call the office for instructions.    When should I use additional contraception?  Use a back-up with the first pack of pills  Use a back-up if you miss more than one pill in a pack  Use a back-up if you have bleeding during your active pills  Use a back-up for safe sexual practice, to prevent disease  Use a back-up if you are taking a medication that decreases the pills effectiveness    DANGER SIGNS - CALL THE OFFICE [phone]   A - Abdominal pain (severe)  C - Chest pain (severe)  H - Headaches (severe)  E - Eye problems (loss of vision, blurring of vision)  S - Severe leg pain in the calf or thigh

## 2024-11-18 ENCOUNTER — LAB ENCOUNTER (OUTPATIENT)
Dept: LAB | Age: 34
End: 2024-11-18
Attending: NURSE PRACTITIONER
Payer: COMMERCIAL

## 2024-11-18 ENCOUNTER — APPOINTMENT (OUTPATIENT)
Dept: LAB | Age: 34
End: 2024-11-18
Payer: COMMERCIAL

## 2024-11-18 ENCOUNTER — EKG ENCOUNTER (OUTPATIENT)
Dept: LAB | Age: 34
End: 2024-11-18
Attending: NURSE PRACTITIONER
Payer: COMMERCIAL

## 2024-11-18 ENCOUNTER — OFFICE VISIT (OUTPATIENT)
Facility: LOCATION | Age: 34
End: 2024-11-18
Payer: COMMERCIAL

## 2024-11-18 VITALS
HEIGHT: 67 IN | DIASTOLIC BLOOD PRESSURE: 67 MMHG | OXYGEN SATURATION: 100 % | BODY MASS INDEX: 24.92 KG/M2 | WEIGHT: 158.81 LBS | RESPIRATION RATE: 18 BRPM | HEART RATE: 64 BPM | SYSTOLIC BLOOD PRESSURE: 113 MMHG

## 2024-11-18 DIAGNOSIS — Z00.00 ANNUAL PHYSICAL EXAM: Primary | ICD-10-CM

## 2024-11-18 DIAGNOSIS — R07.9 CHEST PAIN, UNSPECIFIED TYPE: ICD-10-CM

## 2024-11-18 DIAGNOSIS — Z13.21 SCREENING FOR ENDOCRINE, NUTRITIONAL, METABOLIC AND IMMUNITY DISORDER: ICD-10-CM

## 2024-11-18 DIAGNOSIS — Z13.0 SCREENING FOR ENDOCRINE, NUTRITIONAL, METABOLIC AND IMMUNITY DISORDER: ICD-10-CM

## 2024-11-18 DIAGNOSIS — Z13.228 SCREENING FOR ENDOCRINE, NUTRITIONAL, METABOLIC AND IMMUNITY DISORDER: ICD-10-CM

## 2024-11-18 DIAGNOSIS — Z23 NEED FOR VACCINATION: ICD-10-CM

## 2024-11-18 DIAGNOSIS — Z23 ENCOUNTER FOR IMMUNIZATION: ICD-10-CM

## 2024-11-18 DIAGNOSIS — Z13.29 SCREENING FOR ENDOCRINE, NUTRITIONAL, METABOLIC AND IMMUNITY DISORDER: ICD-10-CM

## 2024-11-18 PROBLEM — R00.1 BRADYCARDIA: Status: RESOLVED | Noted: 2023-12-20 | Resolved: 2024-11-18

## 2024-11-18 PROBLEM — O13.3 GESTATIONAL HYPERTENSION, THIRD TRIMESTER (HCC): Status: RESOLVED | Noted: 2023-12-20 | Resolved: 2024-11-18

## 2024-11-18 PROBLEM — E55.9 VITAMIN D DEFICIENCY: Status: RESOLVED | Noted: 2023-12-22 | Resolved: 2024-11-18

## 2024-11-18 PROBLEM — R10.2 PELVIC PAIN: Status: RESOLVED | Noted: 2020-12-10 | Resolved: 2024-11-18

## 2024-11-18 PROBLEM — Z3A.29 29 WEEKS GESTATION OF PREGNANCY (HCC): Status: RESOLVED | Noted: 2023-12-20 | Resolved: 2024-11-18

## 2024-11-18 PROBLEM — O36.0931: Status: RESOLVED | Noted: 2023-12-20 | Resolved: 2024-11-18

## 2024-11-18 PROBLEM — Z34.93 PREGNANCY WITH PRENATAL CARE ELSEWHERE IN THIRD TRIMESTER (HCC): Status: RESOLVED | Noted: 2023-12-20 | Resolved: 2024-11-18

## 2024-11-18 PROBLEM — O36.5931 IUGR (INTRAUTERINE GROWTH RESTRICTION) AFFECTING CARE OF MOTHER, THIRD TRIMESTER, FETUS 1 (HCC): Status: RESOLVED | Noted: 2023-12-19 | Resolved: 2024-11-18

## 2024-11-18 LAB
ALBUMIN SERPL-MCNC: 4.5 G/DL (ref 3.2–4.8)
ALBUMIN/GLOB SERPL: 1.4 {RATIO} (ref 1–2)
ALP LIVER SERPL-CCNC: 65 U/L
ALT SERPL-CCNC: 9 U/L
ANION GAP SERPL CALC-SCNC: 6 MMOL/L (ref 0–18)
AST SERPL-CCNC: 16 U/L (ref ?–34)
BILIRUB SERPL-MCNC: 1 MG/DL (ref 0.3–1.2)
BUN BLD-MCNC: 8 MG/DL (ref 9–23)
BUN/CREAT SERPL: 11 (ref 10–20)
CALCIUM BLD-MCNC: 9.8 MG/DL (ref 8.7–10.4)
CHLORIDE SERPL-SCNC: 104 MMOL/L (ref 98–112)
CHOLEST SERPL-MCNC: 181 MG/DL (ref ?–200)
CO2 SERPL-SCNC: 28 MMOL/L (ref 21–32)
CREAT BLD-MCNC: 0.73 MG/DL
DEPRECATED RDW RBC AUTO: 44.4 FL (ref 35.1–46.3)
EGFRCR SERPLBLD CKD-EPI 2021: 111 ML/MIN/1.73M2 (ref 60–?)
ERYTHROCYTE [DISTWIDTH] IN BLOOD BY AUTOMATED COUNT: 14.4 % (ref 11–15)
EST. AVERAGE GLUCOSE BLD GHB EST-MCNC: 126 MG/DL (ref 68–126)
FASTING PATIENT LIPID ANSWER: NO
FASTING STATUS PATIENT QL REPORTED: NO
GLOBULIN PLAS-MCNC: 3.2 G/DL (ref 2–3.5)
GLUCOSE BLD-MCNC: 86 MG/DL (ref 70–99)
HBA1C MFR BLD: 6 % (ref ?–5.7)
HCT VFR BLD AUTO: 39.9 %
HDLC SERPL-MCNC: 46 MG/DL (ref 40–59)
HGB BLD-MCNC: 13.1 G/DL
LDLC SERPL CALC-MCNC: 109 MG/DL (ref ?–100)
MCH RBC QN AUTO: 27.6 PG (ref 26–34)
MCHC RBC AUTO-ENTMCNC: 32.8 G/DL (ref 31–37)
MCV RBC AUTO: 84 FL
NONHDLC SERPL-MCNC: 135 MG/DL (ref ?–130)
OSMOLALITY SERPL CALC.SUM OF ELEC: 284 MOSM/KG (ref 275–295)
PLATELET # BLD AUTO: 255 10(3)UL (ref 150–450)
POTASSIUM SERPL-SCNC: 4 MMOL/L (ref 3.5–5.1)
PROT SERPL-MCNC: 7.7 G/DL (ref 5.7–8.2)
RBC # BLD AUTO: 4.75 X10(6)UL
SODIUM SERPL-SCNC: 138 MMOL/L (ref 136–145)
TRIGL SERPL-MCNC: 147 MG/DL (ref 30–149)
TSI SER-ACNC: 2.13 UIU/ML (ref 0.55–4.78)
VIT D+METAB SERPL-MCNC: 24.9 NG/ML (ref 30–100)
VLDLC SERPL CALC-MCNC: 25 MG/DL (ref 0–30)
WBC # BLD AUTO: 11.1 X10(3) UL (ref 4–11)

## 2024-11-18 PROCEDURE — 93010 ELECTROCARDIOGRAM REPORT: CPT | Performed by: INTERNAL MEDICINE

## 2024-11-18 PROCEDURE — 80061 LIPID PANEL: CPT | Performed by: NURSE PRACTITIONER

## 2024-11-18 PROCEDURE — 36415 COLL VENOUS BLD VENIPUNCTURE: CPT | Performed by: NURSE PRACTITIONER

## 2024-11-18 PROCEDURE — 84443 ASSAY THYROID STIM HORMONE: CPT | Performed by: NURSE PRACTITIONER

## 2024-11-18 PROCEDURE — 80053 COMPREHEN METABOLIC PANEL: CPT | Performed by: NURSE PRACTITIONER

## 2024-11-18 PROCEDURE — 85027 COMPLETE CBC AUTOMATED: CPT | Performed by: NURSE PRACTITIONER

## 2024-11-18 PROCEDURE — 82306 VITAMIN D 25 HYDROXY: CPT | Performed by: NURSE PRACTITIONER

## 2024-11-18 PROCEDURE — 83036 HEMOGLOBIN GLYCOSYLATED A1C: CPT | Performed by: NURSE PRACTITIONER

## 2024-11-18 PROCEDURE — 93005 ELECTROCARDIOGRAM TRACING: CPT

## 2024-11-18 NOTE — PROGRESS NOTES
INTERNAL MEDICINE ANNUAL EXAM NOTE     Patient ID: Mandy Olsen is a 34 year old female.  Chief Complaint: Physical      Mandy Olsen is a pleasant 34 year old female who presents for annual physical exam. Mandy Olsen is doing well today.  Patient was previously diagnosed with gestational hypertension in the third trimester, and was started on nifedipine at that time.  Patient has not taken this medication for 10 months, and her blood pressures have been controlled.  Her blood pressure today is 113/67.  She had her well woman exam this past October and was started on birth control norethindrone acet-ethinyl est 1.5-30 mg-mcg, she is taking this without any problems.  She reports having chest pain and chest tightness and describes her heart as heart racing twice recently, once in July and once in October.  She states these episodes lasted only a few minutes and went away on their own.  She did not associate the symptoms with any cold symptoms cough congestion or illnesses.  She states she has had increased stress and anxiety in her life this past year, however she did not feel overly stressed or anxious at the time of these episodes.  She has never had these symptoms in the past.       Health Maintenance  - All care gaps addressed with patient.     Review of Systems  Review of Systems    Physical Exam  Vitals:    11/18/24 1302   BP: 113/67   Pulse: 64   Resp: 18   SpO2: 100%   Weight: 158 lb 12.8 oz (72 kg)   Height: 5' 7\" (1.702 m)     Body mass index is 24.87 kg/m².  BP Readings from Last 3 Encounters:   11/18/24 113/67   11/14/24 121/71   10/11/24 127/83     Physical Exam      Labs & Imaging  Pertinent labs and imaging reviewed.   Lab Results   Component Value Date    GLU 57 (L) 12/21/2023    BUN 7 (L) 12/21/2023    CREATSERUM 0.62 12/21/2023    ANIONGAP 6 12/21/2023    CA 8.1 (L) 12/21/2023    OSMOCALC 286 12/21/2023    ALKPHO 92 12/21/2023    AST 15 12/21/2023    ALT  14 2023    BILT 0.4 2023    TP 5.4 (L) 2023    ALB 2.2 (L) 2023    GLOBULIN 3.2 2023     2023    K 4.2 2023     2023    CO2 23.0 2023     No results found for: \"EAG\", \"A1C\"  Lab Results   Component Value Date    WBC 12.8 (H) 2023    RBC 4.22 2023    HGB 12.3 2023    HCT 37.7 2023    MCV 89.3 2023    MCH 29.1 2023    MCHC 32.6 2023    RDW 14.7 2023    .0 2023     No results found for: \"CHOLEST\", \"TRIG\", \"HDL\", \"LDL\", \"VLDL\", \"TCHDLRATIO\", \"NONHDLC\", \"CHOLHDLRATIO\", \"CALCNONHDL\"  The ASCVD Risk score (Lala DK, et al., 2019) failed to calculate for the following reasons:    The 2019 ASCVD risk score is only valid for ages 40 to 79    Medical History    Reviewed allergies:  Allergies[1]     Reviewed:  Patient Active Problem List    Diagnosis    Rh negative state in antepartum period ([facility])      Reviewed:  Past Medical History:    29 weeks gestation of pregnancy ([facility])    Anxiety and depression    Bradycardia    Chickenpox    COVID-19    positive test    Delivery by classical  section ([facility])    Fetal growth restriction antepartum ([facility])    early onset FGR with abnormal doppler (REDV) at 28+ wk    Gestational hypertension, third trimester ([facility])    noted at 28+ wk    History of classical  section    CLASSICAL  at 29w1d. Fetal growth restriction with REDV, decelerations    History of pelvic ultrasound    12/10/2020 Pelvic US for pelvic pain - unremarkable. CareEverywhere    IUGR (intrauterine growth restriction) affecting care of mother, third trimester, fetus 1 ([facility])    Non-reassuring fetal status, delivered, current hospitalization ([facility])    Pap smear for cervical cancer screening    Pap & HPV negative    Pelvic pain    Pregnancy with prenatal care elsewhere in third trimester ([facility])    Rh alloimmunization, maternal, antepartum, third trimester, fetus 1 ([facility])    11/15/23 Ab  screen negative. 23 Anti-D titer 256.    Vitamin D deficiency      Reviewed:  Family History   Problem Relation Age of Onset    Diabetes Maternal Grandmother     High Cholesterol Maternal Grandmother     Diabetes Maternal Grandfather     High Cholesterol Maternal Grandfather     Other (IUGR/SGA) Daughter     Genetic Disease Neg        Reviewed:  Past Surgical History:   Procedure Laterality Date      2023    CLASSICAL  at 29w1d. Fetal growth restriction with REDV, decelerations, breech. Dr. Natividad Avila, [facility]      Reviewed:  Social History     Socioeconomic History    Marital status:    Tobacco Use    Smoking status: Never    Smokeless tobacco: Never   Vaping Use    Vaping status: Never Used   Substance and Sexual Activity    Alcohol use: Yes     Alcohol/week: 1.0 standard drink of alcohol     Types: 1 Glasses of wine per week     Comment: socially    Drug use: Yes     Types: Cannabis     Social Drivers of Health     Financial Resource Strain: Low Risk  (2023)    Financial Resource Strain     Difficulty of Paying Living Expenses: Not hard at all     Med Affordability: No   Food Insecurity: No Food Insecurity (2023)    Food Insecurity     Food Insecurity: Never true   Transportation Needs: No Transportation Needs (2023)    Transportation Needs     Lack of Transportation: No   Stress: No Stress Concern Present (2023)    Stress     Feeling of Stress : No   Housing Stability: Low Risk  (2023)    Housing Stability     Housing Instability: No      Reviewed:  Current Outpatient Medications   Medication Sig Dispense Refill    Norethindrone Acet-Ethinyl Est 1.5-30 MG-MCG Oral Tab Take 1 tablet by mouth daily. 84 tablet 0    acetaminophen 500 MG Oral Tab Take 1 tablet (500 mg total) by mouth every 6 (six) hours as needed for Pain. 30 tablet 0    prenatal vitamin with DHA 27-0.8-228 MG Oral Cap Take 1 capsule by mouth daily.            Assessment  & Plan    1. Annual physical exam    2. Encounter for immunization    3. Screening for endocrine, nutritional, metabolic and immunity disorder  - Comp Metabolic Panel (14)  - Hemoglobin A1C  - Lipid Panel  - TSH W Reflex To Free T4  - Vitamin D, 25-Hydroxy  - CBC, Platelet; No Differential    4. Need for vaccination  - Human Papillomavirus 9-valent vaccine, Recombinant (Gardasil 9) HPV 9 [84724]    5. Chest pain, unspecified type  - XR CHEST PA + LAT CHEST (CPT=71046); Future  - ELECTROCARDIOGRAM, COMPLETE        Plan  Overall doing well today. Screening labs, preventive imaging/procedure, and health care gaps addressed as per USPSTF guidelines, orders available electronically via [employer] and in [facility].  Vaccines discussed and administered depending on availability and per patient preference; patient to return for any outstanding vaccines once available.  Patient brought to  to help schedule care gaps and follow up. Further recommendations depending on lab results.  Patient's blood pressure is well-controlled, she has not been taking nifedipine for several months.  We will discontinue this medication and continue to monitor.  She reports 2 episodes of chest pain and chest tightness with heart racing, once in July and once in October.  These episodes were brief and resolved on their own.  Due to the symptoms, and recent start of her birth control medications, we will check an EKG and a chest x-ray along with her normal annual screening labs.  She received her first HPV vaccine in October, we will administer the second vaccine today and she will return in 4 months for her third and final vaccine in the series.  Further recommendations to follow.        Follow Up:   Return for 1 YEAR FOR ANNUAL OR DEPENDING ON LAB RESULTS, VISIT FOR 3RD HPV VACCINE IN [facility].      NAZANIN Perdue  Internal Medicine      Patient asked to sign release of information for outside records if not already requested, make future  office/imaging appointments at the  prior to leaving, and to [facility] if not already active.  Preventive measures and further education discussed with patient as per after visit summary. Potential medication side effects discussed. All questions answered to best of ability.   Call office with any questions. Seek emergency care if necessary.   Patient understands and agrees to follow directions and advice.      ----------------------------------------- PATIENT INSTRUCTIONS-----------------------------------------     There are no Patient Instructions on file for this visit.           [1]   Allergies  Allergen Reactions    Seasonal OTHER (SEE COMMENTS)     Itchy eyes, congestion, sneezing

## 2024-11-19 LAB
ATRIAL RATE: 61 BPM
P AXIS: 12 DEGREES
P-R INTERVAL: 136 MS
Q-T INTERVAL: 412 MS
QRS DURATION: 86 MS
QTC CALCULATION (BEZET): 414 MS
R AXIS: 9 DEGREES
T AXIS: 31 DEGREES
VENTRICULAR RATE: 61 BPM

## 2024-11-20 ENCOUNTER — HOSPITAL ENCOUNTER (OUTPATIENT)
Dept: GENERAL RADIOLOGY | Facility: HOSPITAL | Age: 34
Discharge: HOME | End: 2024-11-20
Attending: NURSE PRACTITIONER
Payer: COMMERCIAL

## 2024-11-20 DIAGNOSIS — R07.9 CHEST PAIN, UNSPECIFIED TYPE: ICD-10-CM

## 2024-11-20 PROCEDURE — 71046 X-RAY EXAM CHEST 2 VIEWS: CPT | Performed by: NURSE PRACTITIONER

## 2024-12-16 RX ORDER — NORETHINDRONE ACETATE AND ETHINYL ESTRADIOL .03; 1.5 MG/1; MG/1
1 TABLET ORAL DAILY
Qty: 1 EACH | Refills: 0 | Status: SHIPPED | OUTPATIENT
Start: 2024-12-16 | End: 2025-12-16

## 2025-01-06 RX ORDER — NORETHINDRONE ACETATE AND ETHINYL ESTRADIOL 1.5-30(21)
1 KIT ORAL DAILY
Qty: 84 TABLET | Refills: 3 | Status: SHIPPED | OUTPATIENT
Start: 2025-01-06

## 2025-01-09 RX ORDER — NORETHINDRONE ACETATE AND ETHINYL ESTRADIOL 1.5-30(21)
1 KIT ORAL DAILY
Qty: 84 TABLET | Refills: 3 | OUTPATIENT
Start: 2025-01-09

## 2025-01-09 RX ORDER — NORETHINDRONE ACETATE AND ETHINYL ESTRADIOL 1.5; 3 MG/1; UG/1
1 TABLET ORAL DAILY
Qty: 21 TABLET | Refills: 0 | OUTPATIENT
Start: 2025-01-09

## 2025-06-17 RX ORDER — NORETHINDRONE ACETATE AND ETHINYL ESTRADIOL 1.5-30(21)
1 KIT ORAL DAILY
Qty: 84 TABLET | Refills: 3 | OUTPATIENT
Start: 2025-06-17

## (undated) DEVICE — LARGE, DISPOSABLE ALEXIS O C-SECTION PROTECTOR - RETRACTOR: Brand: ALEXIS ® O C-SECTION PROTECTOR - RETRACTOR

## (undated) NOTE — LETTER
VACCINE ADMINISTRATION RECORD  PARENT / GUARDIAN APPROVAL  Date: 10/11/2024  Vaccine administered to: Mandy Olsen     : 1990    MRN: [id number]    A copy of the appropriate Centers for Disease Control and Prevention Vaccine Information statement has been provided. I have read or have had explained the information about the diseases and the vaccines listed below. There was an opportunity to ask questions and any questions were answered satisfactorily. I believe that I understand the benefits and risks of the vaccine cited and ask that the vaccine(s) listed below be given to me or to the person named above (for whom I am authorized to make this request).    VACCINES ADMINISTERED:  Gardasil    I have read and hereby agree to be bound by the terms of this agreement as stated above. My signature is valid until revoked by me in writing.  This document is signed by Farzaneh Olsen , relationship: Self on 10/11/2024.:                                                                                                                                         Parent / Guardian Signature                                                Date    Omaira GAMEZ MA served as a witness to authentication that the identity of the person signing electronically is in fact the person represented as signing.    This document was generated by Omaira GAMEZ MA on 10/11/2024.

## (undated) NOTE — LETTER
VACCINE ADMINISTRATION RECORD  PARENT / GUARDIAN APPROVAL  Date: 2023  Vaccine administered to: Sommer Weinstein     : 1990    MRN: [id number]    A copy of the appropriate Centers for Disease Control and Prevention Vaccine Information statement has been provided. I have read or have had explained the information about the diseases and the vaccines listed below. There was an opportunity to ask questions and any questions were answered satisfactorily. I believe that I understand the benefits and risks of the vaccine cited and ask that the vaccine(s) listed below be given to me or to the person named above (for whom I am authorized to make this request). VACCINES ADMINISTERED:  Tdap    I have read and hereby agree to be bound by the terms of this agreement as stated above. My signature is valid until revoked by me in writing. This document is signed by patient, relationship: Self on 2023.:                                                                                                                                         Parent / Danne Dyers                                                Date    AELXANDRA Smith served as a witness to authentication that the identity of the person signing electronically is in fact the person represented as signing. This document was generated by ALEXANDRA Smith on 2023.